# Patient Record
Sex: FEMALE | Race: WHITE | NOT HISPANIC OR LATINO | URBAN - METROPOLITAN AREA
[De-identification: names, ages, dates, MRNs, and addresses within clinical notes are randomized per-mention and may not be internally consistent; named-entity substitution may affect disease eponyms.]

---

## 2023-11-11 VITALS
RESPIRATION RATE: 17 BRPM | SYSTOLIC BLOOD PRESSURE: 141 MMHG | DIASTOLIC BLOOD PRESSURE: 88 MMHG | TEMPERATURE: 98 F | HEART RATE: 123 BPM | OXYGEN SATURATION: 96 %

## 2023-11-11 LAB
ALBUMIN SERPL ELPH-MCNC: 4.2 G/DL — SIGNIFICANT CHANGE UP (ref 3.4–5)
ALBUMIN SERPL ELPH-MCNC: 4.2 G/DL — SIGNIFICANT CHANGE UP (ref 3.4–5)
ALP SERPL-CCNC: 74 U/L — SIGNIFICANT CHANGE UP (ref 40–120)
ALP SERPL-CCNC: 74 U/L — SIGNIFICANT CHANGE UP (ref 40–120)
ALT FLD-CCNC: 34 U/L — SIGNIFICANT CHANGE UP (ref 12–42)
ALT FLD-CCNC: 34 U/L — SIGNIFICANT CHANGE UP (ref 12–42)
ANION GAP SERPL CALC-SCNC: 10 MMOL/L — SIGNIFICANT CHANGE UP (ref 9–16)
ANION GAP SERPL CALC-SCNC: 10 MMOL/L — SIGNIFICANT CHANGE UP (ref 9–16)
APTT BLD: 41.7 SEC — HIGH (ref 24.5–35.6)
APTT BLD: 41.7 SEC — HIGH (ref 24.5–35.6)
AST SERPL-CCNC: 19 U/L — SIGNIFICANT CHANGE UP (ref 15–37)
AST SERPL-CCNC: 19 U/L — SIGNIFICANT CHANGE UP (ref 15–37)
BASOPHILS # BLD AUTO: 0.04 K/UL — SIGNIFICANT CHANGE UP (ref 0–0.2)
BASOPHILS # BLD AUTO: 0.04 K/UL — SIGNIFICANT CHANGE UP (ref 0–0.2)
BASOPHILS NFR BLD AUTO: 0.5 % — SIGNIFICANT CHANGE UP (ref 0–2)
BASOPHILS NFR BLD AUTO: 0.5 % — SIGNIFICANT CHANGE UP (ref 0–2)
BILIRUB SERPL-MCNC: 0.4 MG/DL — SIGNIFICANT CHANGE UP (ref 0.2–1.2)
BILIRUB SERPL-MCNC: 0.4 MG/DL — SIGNIFICANT CHANGE UP (ref 0.2–1.2)
BUN SERPL-MCNC: 17 MG/DL — SIGNIFICANT CHANGE UP (ref 7–23)
BUN SERPL-MCNC: 17 MG/DL — SIGNIFICANT CHANGE UP (ref 7–23)
CALCIUM SERPL-MCNC: 9.3 MG/DL — SIGNIFICANT CHANGE UP (ref 8.5–10.5)
CALCIUM SERPL-MCNC: 9.3 MG/DL — SIGNIFICANT CHANGE UP (ref 8.5–10.5)
CHLORIDE SERPL-SCNC: 100 MMOL/L — SIGNIFICANT CHANGE UP (ref 96–108)
CHLORIDE SERPL-SCNC: 100 MMOL/L — SIGNIFICANT CHANGE UP (ref 96–108)
CO2 SERPL-SCNC: 26 MMOL/L — SIGNIFICANT CHANGE UP (ref 22–31)
CO2 SERPL-SCNC: 26 MMOL/L — SIGNIFICANT CHANGE UP (ref 22–31)
CREAT SERPL-MCNC: 0.89 MG/DL — SIGNIFICANT CHANGE UP (ref 0.5–1.3)
CREAT SERPL-MCNC: 0.89 MG/DL — SIGNIFICANT CHANGE UP (ref 0.5–1.3)
EGFR: 66 ML/MIN/1.73M2 — SIGNIFICANT CHANGE UP
EGFR: 66 ML/MIN/1.73M2 — SIGNIFICANT CHANGE UP
EOSINOPHIL # BLD AUTO: 0.11 K/UL — SIGNIFICANT CHANGE UP (ref 0–0.5)
EOSINOPHIL # BLD AUTO: 0.11 K/UL — SIGNIFICANT CHANGE UP (ref 0–0.5)
EOSINOPHIL NFR BLD AUTO: 1.5 % — SIGNIFICANT CHANGE UP (ref 0–6)
EOSINOPHIL NFR BLD AUTO: 1.5 % — SIGNIFICANT CHANGE UP (ref 0–6)
GLUCOSE SERPL-MCNC: 126 MG/DL — HIGH (ref 70–99)
GLUCOSE SERPL-MCNC: 126 MG/DL — HIGH (ref 70–99)
HCT VFR BLD CALC: 39.9 % — SIGNIFICANT CHANGE UP (ref 34.5–45)
HCT VFR BLD CALC: 39.9 % — SIGNIFICANT CHANGE UP (ref 34.5–45)
HGB BLD-MCNC: 13.4 G/DL — SIGNIFICANT CHANGE UP (ref 11.5–15.5)
HGB BLD-MCNC: 13.4 G/DL — SIGNIFICANT CHANGE UP (ref 11.5–15.5)
IMM GRANULOCYTES NFR BLD AUTO: 0.4 % — SIGNIFICANT CHANGE UP (ref 0–0.9)
IMM GRANULOCYTES NFR BLD AUTO: 0.4 % — SIGNIFICANT CHANGE UP (ref 0–0.9)
INR BLD: 1.07 — SIGNIFICANT CHANGE UP (ref 0.85–1.18)
INR BLD: 1.07 — SIGNIFICANT CHANGE UP (ref 0.85–1.18)
LYMPHOCYTES # BLD AUTO: 2.82 K/UL — SIGNIFICANT CHANGE UP (ref 1–3.3)
LYMPHOCYTES # BLD AUTO: 2.82 K/UL — SIGNIFICANT CHANGE UP (ref 1–3.3)
LYMPHOCYTES # BLD AUTO: 37.7 % — SIGNIFICANT CHANGE UP (ref 13–44)
LYMPHOCYTES # BLD AUTO: 37.7 % — SIGNIFICANT CHANGE UP (ref 13–44)
MCHC RBC-ENTMCNC: 33.6 GM/DL — SIGNIFICANT CHANGE UP (ref 32–36)
MCHC RBC-ENTMCNC: 33.6 GM/DL — SIGNIFICANT CHANGE UP (ref 32–36)
MCHC RBC-ENTMCNC: 34.6 PG — HIGH (ref 27–34)
MCHC RBC-ENTMCNC: 34.6 PG — HIGH (ref 27–34)
MCV RBC AUTO: 103.1 FL — HIGH (ref 80–100)
MCV RBC AUTO: 103.1 FL — HIGH (ref 80–100)
MONOCYTES # BLD AUTO: 0.75 K/UL — SIGNIFICANT CHANGE UP (ref 0–0.9)
MONOCYTES # BLD AUTO: 0.75 K/UL — SIGNIFICANT CHANGE UP (ref 0–0.9)
MONOCYTES NFR BLD AUTO: 10 % — SIGNIFICANT CHANGE UP (ref 2–14)
MONOCYTES NFR BLD AUTO: 10 % — SIGNIFICANT CHANGE UP (ref 2–14)
NEUTROPHILS # BLD AUTO: 3.74 K/UL — SIGNIFICANT CHANGE UP (ref 1.8–7.4)
NEUTROPHILS # BLD AUTO: 3.74 K/UL — SIGNIFICANT CHANGE UP (ref 1.8–7.4)
NEUTROPHILS NFR BLD AUTO: 49.9 % — SIGNIFICANT CHANGE UP (ref 43–77)
NEUTROPHILS NFR BLD AUTO: 49.9 % — SIGNIFICANT CHANGE UP (ref 43–77)
NRBC # BLD: 0 /100 WBCS — SIGNIFICANT CHANGE UP (ref 0–0)
NRBC # BLD: 0 /100 WBCS — SIGNIFICANT CHANGE UP (ref 0–0)
PLATELET # BLD AUTO: 195 K/UL — SIGNIFICANT CHANGE UP (ref 150–400)
PLATELET # BLD AUTO: 195 K/UL — SIGNIFICANT CHANGE UP (ref 150–400)
POTASSIUM SERPL-MCNC: 3.8 MMOL/L — SIGNIFICANT CHANGE UP (ref 3.5–5.3)
POTASSIUM SERPL-MCNC: 3.8 MMOL/L — SIGNIFICANT CHANGE UP (ref 3.5–5.3)
POTASSIUM SERPL-SCNC: 3.8 MMOL/L — SIGNIFICANT CHANGE UP (ref 3.5–5.3)
POTASSIUM SERPL-SCNC: 3.8 MMOL/L — SIGNIFICANT CHANGE UP (ref 3.5–5.3)
PROT SERPL-MCNC: 7.4 G/DL — SIGNIFICANT CHANGE UP (ref 6.4–8.2)
PROT SERPL-MCNC: 7.4 G/DL — SIGNIFICANT CHANGE UP (ref 6.4–8.2)
PROTHROM AB SERPL-ACNC: 12.1 SEC — SIGNIFICANT CHANGE UP (ref 9.5–13)
PROTHROM AB SERPL-ACNC: 12.1 SEC — SIGNIFICANT CHANGE UP (ref 9.5–13)
RBC # BLD: 3.87 M/UL — SIGNIFICANT CHANGE UP (ref 3.8–5.2)
RBC # BLD: 3.87 M/UL — SIGNIFICANT CHANGE UP (ref 3.8–5.2)
RBC # FLD: 12.7 % — SIGNIFICANT CHANGE UP (ref 10.3–14.5)
RBC # FLD: 12.7 % — SIGNIFICANT CHANGE UP (ref 10.3–14.5)
SODIUM SERPL-SCNC: 136 MMOL/L — SIGNIFICANT CHANGE UP (ref 132–145)
SODIUM SERPL-SCNC: 136 MMOL/L — SIGNIFICANT CHANGE UP (ref 132–145)
TROPONIN I, HIGH SENSITIVITY RESULT: 152.7 NG/L — HIGH
TROPONIN I, HIGH SENSITIVITY RESULT: 152.7 NG/L — HIGH
TROPONIN I, HIGH SENSITIVITY RESULT: 170.4 NG/L — HIGH
TROPONIN I, HIGH SENSITIVITY RESULT: 170.4 NG/L — HIGH
WBC # BLD: 7.49 K/UL — SIGNIFICANT CHANGE UP (ref 3.8–10.5)
WBC # BLD: 7.49 K/UL — SIGNIFICANT CHANGE UP (ref 3.8–10.5)
WBC # FLD AUTO: 7.49 K/UL — SIGNIFICANT CHANGE UP (ref 3.8–10.5)
WBC # FLD AUTO: 7.49 K/UL — SIGNIFICANT CHANGE UP (ref 3.8–10.5)

## 2023-11-11 PROCEDURE — 99291 CRITICAL CARE FIRST HOUR: CPT

## 2023-11-11 PROCEDURE — 70496 CT ANGIOGRAPHY HEAD: CPT | Mod: 26

## 2023-11-11 PROCEDURE — 71045 X-RAY EXAM CHEST 1 VIEW: CPT | Mod: 26

## 2023-11-11 PROCEDURE — 0042T: CPT

## 2023-11-11 PROCEDURE — 70498 CT ANGIOGRAPHY NECK: CPT | Mod: 26

## 2023-11-11 RX ORDER — APIXABAN 2.5 MG/1
2.5 TABLET, FILM COATED ORAL ONCE
Refills: 0 | Status: COMPLETED | OUTPATIENT
Start: 2023-11-11 | End: 2023-11-11

## 2023-11-11 RX ORDER — CLOPIDOGREL BISULFATE 75 MG/1
300 TABLET, FILM COATED ORAL ONCE
Refills: 0 | Status: DISCONTINUED | OUTPATIENT
Start: 2023-11-11 | End: 2023-11-11

## 2023-11-11 RX ORDER — ASPIRIN/CALCIUM CARB/MAGNESIUM 324 MG
325 TABLET ORAL ONCE
Refills: 0 | Status: DISCONTINUED | OUTPATIENT
Start: 2023-11-11 | End: 2023-11-11

## 2023-11-11 RX ADMIN — APIXABAN 2.5 MILLIGRAM(S): 2.5 TABLET, FILM COATED ORAL at 23:51

## 2023-11-11 NOTE — CONSULT NOTE ADULT - ASSESSMENT
77f hx R MCA aneurysem s/p coiling ?2022, afib on eliquis taken today, pt eating w/ friend when she became light headed and passed out when coming to conciousness patient w/ intelligible speech LKN 7pm, however symptoms resolved within 15mins. CTA w/ moderate stenosis of L PCA p2 segment.     dx TIA vs syncopal episode 2/2 afib   administer asa 325mg x1, plavix 300mg x1   txfer to stroke unit [facility] under Dr. Villarreal for further workup/ management

## 2023-11-11 NOTE — ED ADULT NURSE NOTE - NSFALLUNIVINTERV_ED_ALL_ED
Bed/Stretcher in lowest position, wheels locked, appropriate side rails in place/Call bell, personal items and telephone in reach/Instruct patient to call for assistance before getting out of bed/chair/stretcher/Non-slip footwear applied when patient is off stretcher/[place] to call system/Physically safe environment - no spills, clutter or unnecessary equipment/Purposeful proactive rounding/Room/bathroom lighting operational, light cord in reach

## 2023-11-11 NOTE — ED PROVIDER NOTE - OBJECTIVE STATEMENT
77-year-old female history of A-fib on Eliquis, cerebral aneurysm s/p stent 2021 now here with episode of word salad followed by brief syncopal episode while at dinner with a quick return to baseline.  Symptoms started at 7 PM.  Patient woke up back to baseline.  NIH 0 on arrival.  No neurodeficits. stroke code called on arrival.  Denies nausea, vomiting, fevers, chills, shortness of breath, chest pain.

## 2023-11-11 NOTE — ED PROVIDER NOTE - CRITICAL CARE ATTENDING CONTRIBUTION TO CARE
The patient was seen immediately upon arrival due to a high probability of imminent or life-threatening deterioration secondary to stroke/tia, which required my direct attention, intervention, and personal management at the bedside. I have personally provided critical care time exclusive of time spent on separately billable procedures. Time includes review of laboratory data, radiology results, discussion with consultants, discussion with the patient's family, and monitoring for potential decompensation.    pt with history of A-fib on Eliquis, cerebral artery aneurysm s/p stent presents with witnessed  brief syncope and slurring of words PTA. return to baseline within minutes. NIHSS 0 on arrival.    SUKUMAR Santiago initiated Telestroke consult  admit to dr kunz neurology  for r/o tia

## 2023-11-11 NOTE — ED PROVIDER NOTE - PROGRESS NOTE DETAILS
Patient endorsed to neurology telestroke initially advised admission for TIA work-up.  Endorsed to covering stroke attending.  Agreed pending work-up.  Patient troponin returning mildly elevated with no ischemic EKG changes.  Attempted to contact cardiology.  Advised patient with TIA can make troponins and advised neurology admission.  Through shared decision making agreed to trend troponin and if stable admit to neurology telemetry stroke service.

## 2023-11-11 NOTE — ED ADULT TRIAGE NOTE - CHIEF COMPLAINT QUOTE
patient BIBA from restaurant with friend for syncope, lightheadedness, episode of unintelligible speech around 7pm; stroke code called in front triage; hx a-fib on eliquis and baby asa 81mg

## 2023-11-11 NOTE — ED PROVIDER NOTE - CLINICAL SUMMARY MEDICAL DECISION MAKING FREE TEXT BOX
Patient here history of A-fib on Eliquis, cerebral artery aneurysm s/p stent now here with episode of word salad while at dinner followed by brief syncopal episode with a quick return to baseline.  Patient arrives neurologically intact with NIH of 0.  Telestroke initiated advised by telestroke attending TIA work-up and admission.  Endorsed to stroke attending [place] agreed admission pending work-up.  Troponin mildly elevated with no EKG ischemic changes.  Advised by cardiology neurology admission with cardiology consult.  Through shared decision making agreed to trend troponin.  If troponin stable agree with neurology admission to stroke service.

## 2023-11-11 NOTE — CONSULT NOTE ADULT - SUBJECTIVE AND OBJECTIVE BOX
HISTORY OF PRESENT ILLNESS:  77f hx afib on eliquis taken today, pt eating w/ friend when she became light headed and passed out when coming to conciousness patient w/ intelligible speech LKN 7pm, however symptoms resolved within 15mins.     PAST SURGICAL HISTORY:    HOME MEDICATIONS:  Home Medications:      FAMILY HISTORY:    SOCIAL HISTORY:    ALLERGIES:  fish (Vomiting)  No Known Allergies      VITALS/DATA/ORDERS: [x] Reviewed  Vital Signs Last 24 Hrs  T(C): 36.4 (11 Nov 2023 19:46), Max: 36.4 (11 Nov 2023 19:46)  T(F): 97.5 (11 Nov 2023 19:46), Max: 97.5 (11 Nov 2023 19:46)  HR: 110 (11 Nov 2023 20:24) (110 - 123)  BP: 147/81 (11 Nov 2023 20:24) (141/88 - 147/81)  BP(mean): --  RR: 16 (11 Nov 2023 20:24) (16 - 17)  SpO2: 100% (11 Nov 2023 20:24) (96% - 100%)    Parameters below as of 11 Nov 2023 20:24  Patient On (Oxygen Delivery Method): room air                            13.4   7.49  )-----------( 195      ( 11 Nov 2023 19:51 )             39.9     11-11    136  |  100  |  17  ----------------------------<  126<H>  3.8   |  26  |  0.89    Ca    9.3      11 Nov 2023 19:51    TPro  7.4  /  Alb  4.2  /  TBili  0.4  /  DBili  x   /  AST  19  /  ALT  34  /  AlkPhos  74  11-11    PT/INR - ( 11 Nov 2023 19:51 )   PT: 12.1 sec;   INR: 1.07          PTT - ( 11 Nov 2023 19:51 )  PTT:41.7 sec  CAPILLARY BLOOD GLUCOSE      POCT Blood Glucose.: 106 mg/dL (11 Nov 2023 19:53)    CT Head:     EXAMINATION: Assisted by (OSH staff)  NIHSS: 0    1A: Level of consciousness       0= Alert; keenly responsive       +1= Arouses to minor stimulation       +2= Requires repeated stimulation to arouse       +2= Movements to pain       +3= Postures or unresponsive  1B: Ask month and age       0= Both questions right       +1= 1 question right       +2= 0 questions right       +1= Dysarthric/intubated/trauma/language barrier       +2= Aphasic  1C: "Blink eyes" and "Squeeze Hands"       0= Performs both       +1= Performs 1 task       +2= Performs 0 tasks    2: Horizontal EOMs       0= Normal       +1= Partial gaze palsy: can be overcome       +1= Partial gaze palsy: corrects w/ oculocephalic reflex        +2= Forzed gaze palsy: cannot be overcome    3: Visual fields       0= No visual loss       +1= Partial hemianopia       +2= Complete hemianopia       +3= Patient is b/l blind       +3= B/l hemianopia    4: Facial palsy (use grimace if obtunded)       0= Normal symmetry       +1= Minor paralysis (flat NLF, smile asymmetry)       +2= Partial paralysis ( lower face)       +3= Unilateral complete paralysis (upper/lower face)       +3= B/l complete paralysis (upper/lower face)    5A: Left arm motor drift (count out loud and use fingers to show count)       0= No drift x 10 seconds       +1= Drift but doesn't hit bed       +2= Drift, hits bed       +2= Some effort against gravity       +3= No effort against gravity       +4= No movement       0= Amputation/joint fusion  5B: Right arm motor drift       0= No drift x 10 seconds       +1= Drift but doesn't hit bed       +2= Drift, hits bed       +2= Some effort against gravity       +3= No effort against gravity       +4= No movement       0= Amputation/joint fusion    6A: Left leg motor drift       0= No drift x 10 seconds       +1= Drift but doesn't hit bed       +2= Drift, hits bed       +2= Some effort against gravity       +3= No effort against gravity       +4= No movement       0= Amputation/joint fusion    6B: Right leg motor drift       0= No drift x 10 seconds       +1= Drift but doesn't hit bed       +2= Drift, hits bed       +2= Some effort against gravity       +3= No effort against gravity       +4= No movement       0= Amputation/joint fusion    7: Limb ataxia (FNF/heel-shin)       0= No ataxia       +1= Ataxia in 1 limb       +2= Ataxia in 2 limbs       0= Does not understand       0= Paralyzed       0= Amputation/joint fusion    8: Sensation       0= Normal, no sensory loss       +1= mild-moderate loss: less sharp/more dull       +1= mild-moderate loss: can sense being touched       +2= Complete loss: cannot sense being touched at all       +2= No response and quadriplegic       +2= Coma/unresponsive    9: Language/aphasia- describe the scene (on holley); name the items; read the sentences (on holley)       0= Normal, no aphasia       +1= mild-moderate aphaisa: some obvious changes without significant limitation       +2= Severe aphasia: fragmentary expression, inference needed, cannot identify materials       +3= Mute/global aphasia: no usable speech/auditory comprehension       +3= coma/unresponsive    10: Dysarthria- read the words       0= Normal       +1= mild-moderate dysarthria: slurring but can be understood       +2= Severe dysarthria: unintelligible slurring or out of proportion to dysphasia       +2= Mute/anarthric        0= Intubated/unable to test    11: Extinction/inattention       0= No abnormality       +1= visual/tactile/auditory/spatial/personal inattention       +1= Extinction to b/l simultaneous stimulation       +2= Profound micah-inattention (e.g. does not recognize own hand)       +2= extinction to > 1 modality

## 2023-11-12 ENCOUNTER — INPATIENT (INPATIENT)
Facility: HOSPITAL | Age: 78
LOS: 1 days | Discharge: HOME | DRG: 310 | End: 2023-11-14
Attending: PSYCHIATRY & NEUROLOGY | Admitting: PSYCHIATRY & NEUROLOGY
Payer: MEDICARE

## 2023-11-12 DIAGNOSIS — Z98.890 OTHER SPECIFIED POSTPROCEDURAL STATES: Chronic | ICD-10-CM

## 2023-11-12 LAB
A1C WITH ESTIMATED AVERAGE GLUCOSE RESULT: 5.4 % — SIGNIFICANT CHANGE UP (ref 4–5.6)
A1C WITH ESTIMATED AVERAGE GLUCOSE RESULT: 5.4 % — SIGNIFICANT CHANGE UP (ref 4–5.6)
ANION GAP SERPL CALC-SCNC: 10 MMOL/L — SIGNIFICANT CHANGE UP (ref 5–17)
ANION GAP SERPL CALC-SCNC: 10 MMOL/L — SIGNIFICANT CHANGE UP (ref 5–17)
BUN SERPL-MCNC: 14 MG/DL — SIGNIFICANT CHANGE UP (ref 7–23)
BUN SERPL-MCNC: 14 MG/DL — SIGNIFICANT CHANGE UP (ref 7–23)
CALCIUM SERPL-MCNC: 9 MG/DL — SIGNIFICANT CHANGE UP (ref 8.4–10.5)
CALCIUM SERPL-MCNC: 9 MG/DL — SIGNIFICANT CHANGE UP (ref 8.4–10.5)
CHLORIDE SERPL-SCNC: 104 MMOL/L — SIGNIFICANT CHANGE UP (ref 96–108)
CHLORIDE SERPL-SCNC: 104 MMOL/L — SIGNIFICANT CHANGE UP (ref 96–108)
CHOLEST SERPL-MCNC: 200 MG/DL — HIGH
CHOLEST SERPL-MCNC: 200 MG/DL — HIGH
CO2 SERPL-SCNC: 24 MMOL/L — SIGNIFICANT CHANGE UP (ref 22–31)
CO2 SERPL-SCNC: 24 MMOL/L — SIGNIFICANT CHANGE UP (ref 22–31)
CREAT SERPL-MCNC: 0.64 MG/DL — SIGNIFICANT CHANGE UP (ref 0.5–1.3)
CREAT SERPL-MCNC: 0.64 MG/DL — SIGNIFICANT CHANGE UP (ref 0.5–1.3)
EGFR: 90 ML/MIN/1.73M2 — SIGNIFICANT CHANGE UP
EGFR: 90 ML/MIN/1.73M2 — SIGNIFICANT CHANGE UP
ESTIMATED AVERAGE GLUCOSE: 108 MG/DL — SIGNIFICANT CHANGE UP (ref 68–114)
ESTIMATED AVERAGE GLUCOSE: 108 MG/DL — SIGNIFICANT CHANGE UP (ref 68–114)
GLUCOSE SERPL-MCNC: 95 MG/DL — SIGNIFICANT CHANGE UP (ref 70–99)
GLUCOSE SERPL-MCNC: 95 MG/DL — SIGNIFICANT CHANGE UP (ref 70–99)
HCT VFR BLD CALC: 37.7 % — SIGNIFICANT CHANGE UP (ref 34.5–45)
HCT VFR BLD CALC: 37.7 % — SIGNIFICANT CHANGE UP (ref 34.5–45)
HDLC SERPL-MCNC: 53 MG/DL — SIGNIFICANT CHANGE UP
HDLC SERPL-MCNC: 53 MG/DL — SIGNIFICANT CHANGE UP
HGB BLD-MCNC: 12.6 G/DL — SIGNIFICANT CHANGE UP (ref 11.5–15.5)
HGB BLD-MCNC: 12.6 G/DL — SIGNIFICANT CHANGE UP (ref 11.5–15.5)
LIPID PNL WITH DIRECT LDL SERPL: 134 MG/DL — HIGH
LIPID PNL WITH DIRECT LDL SERPL: 134 MG/DL — HIGH
MAGNESIUM SERPL-MCNC: 2.2 MG/DL — SIGNIFICANT CHANGE UP (ref 1.6–2.6)
MAGNESIUM SERPL-MCNC: 2.2 MG/DL — SIGNIFICANT CHANGE UP (ref 1.6–2.6)
MCHC RBC-ENTMCNC: 33.4 GM/DL — SIGNIFICANT CHANGE UP (ref 32–36)
MCHC RBC-ENTMCNC: 33.4 GM/DL — SIGNIFICANT CHANGE UP (ref 32–36)
MCHC RBC-ENTMCNC: 34.1 PG — HIGH (ref 27–34)
MCHC RBC-ENTMCNC: 34.1 PG — HIGH (ref 27–34)
MCV RBC AUTO: 102.2 FL — HIGH (ref 80–100)
MCV RBC AUTO: 102.2 FL — HIGH (ref 80–100)
NON HDL CHOLESTEROL: 147 MG/DL — HIGH
NON HDL CHOLESTEROL: 147 MG/DL — HIGH
NRBC # BLD: 0 /100 WBCS — SIGNIFICANT CHANGE UP (ref 0–0)
NRBC # BLD: 0 /100 WBCS — SIGNIFICANT CHANGE UP (ref 0–0)
PHOSPHATE SERPL-MCNC: 3.7 MG/DL — SIGNIFICANT CHANGE UP (ref 2.5–4.5)
PHOSPHATE SERPL-MCNC: 3.7 MG/DL — SIGNIFICANT CHANGE UP (ref 2.5–4.5)
PLATELET # BLD AUTO: 154 K/UL — SIGNIFICANT CHANGE UP (ref 150–400)
PLATELET # BLD AUTO: 154 K/UL — SIGNIFICANT CHANGE UP (ref 150–400)
POTASSIUM SERPL-MCNC: 4 MMOL/L — SIGNIFICANT CHANGE UP (ref 3.5–5.3)
POTASSIUM SERPL-MCNC: 4 MMOL/L — SIGNIFICANT CHANGE UP (ref 3.5–5.3)
POTASSIUM SERPL-SCNC: 4 MMOL/L — SIGNIFICANT CHANGE UP (ref 3.5–5.3)
POTASSIUM SERPL-SCNC: 4 MMOL/L — SIGNIFICANT CHANGE UP (ref 3.5–5.3)
RBC # BLD: 3.69 M/UL — LOW (ref 3.8–5.2)
RBC # BLD: 3.69 M/UL — LOW (ref 3.8–5.2)
RBC # FLD: 12.9 % — SIGNIFICANT CHANGE UP (ref 10.3–14.5)
RBC # FLD: 12.9 % — SIGNIFICANT CHANGE UP (ref 10.3–14.5)
SODIUM SERPL-SCNC: 138 MMOL/L — SIGNIFICANT CHANGE UP (ref 135–145)
SODIUM SERPL-SCNC: 138 MMOL/L — SIGNIFICANT CHANGE UP (ref 135–145)
TRIGL SERPL-MCNC: 67 MG/DL — SIGNIFICANT CHANGE UP
TRIGL SERPL-MCNC: 67 MG/DL — SIGNIFICANT CHANGE UP
TROPONIN T, HIGH SENSITIVITY RESULT: 21 NG/L — SIGNIFICANT CHANGE UP (ref 0–51)
TROPONIN T, HIGH SENSITIVITY RESULT: 21 NG/L — SIGNIFICANT CHANGE UP (ref 0–51)
TSH SERPL-MCNC: 1.83 UIU/ML — SIGNIFICANT CHANGE UP (ref 0.27–4.2)
TSH SERPL-MCNC: 1.83 UIU/ML — SIGNIFICANT CHANGE UP (ref 0.27–4.2)
WBC # BLD: 5.77 K/UL — SIGNIFICANT CHANGE UP (ref 3.8–10.5)
WBC # BLD: 5.77 K/UL — SIGNIFICANT CHANGE UP (ref 3.8–10.5)
WBC # FLD AUTO: 5.77 K/UL — SIGNIFICANT CHANGE UP (ref 3.8–10.5)
WBC # FLD AUTO: 5.77 K/UL — SIGNIFICANT CHANGE UP (ref 3.8–10.5)

## 2023-11-12 PROCEDURE — 99255 IP/OBS CONSLTJ NEW/EST HI 80: CPT

## 2023-11-12 PROCEDURE — 99253 IP/OBS CNSLTJ NEW/EST LOW 45: CPT

## 2023-11-12 PROCEDURE — 95718 EEG PHYS/QHP 2-12 HR W/VEEG: CPT

## 2023-11-12 PROCEDURE — 99223 1ST HOSP IP/OBS HIGH 75: CPT

## 2023-11-12 PROCEDURE — 71275 CT ANGIOGRAPHY CHEST: CPT | Mod: 26

## 2023-11-12 RX ORDER — CALCIUM CARBONATE 500(1250)
1 TABLET ORAL DAILY
Refills: 0 | Status: DISCONTINUED | OUTPATIENT
Start: 2023-11-12 | End: 2023-11-14

## 2023-11-12 RX ORDER — INFLUENZA VIRUS VACCINE 15; 15; 15; 15 UG/.5ML; UG/.5ML; UG/.5ML; UG/.5ML
0.7 SUSPENSION INTRAMUSCULAR ONCE
Refills: 0 | Status: DISCONTINUED | OUTPATIENT
Start: 2023-11-12 | End: 2023-11-14

## 2023-11-12 RX ORDER — ATORVASTATIN CALCIUM 80 MG/1
80 TABLET, FILM COATED ORAL AT BEDTIME
Refills: 0 | Status: DISCONTINUED | OUTPATIENT
Start: 2023-11-12 | End: 2023-11-14

## 2023-11-12 RX ORDER — SODIUM CHLORIDE 9 MG/ML
500 INJECTION INTRAMUSCULAR; INTRAVENOUS; SUBCUTANEOUS
Refills: 0 | Status: COMPLETED | OUTPATIENT
Start: 2023-11-12 | End: 2023-11-12

## 2023-11-12 RX ORDER — LANOLIN ALCOHOL/MO/W.PET/CERES
3 CREAM (GRAM) TOPICAL AT BEDTIME
Refills: 0 | Status: DISCONTINUED | OUTPATIENT
Start: 2023-11-12 | End: 2023-11-14

## 2023-11-12 RX ORDER — METOPROLOL TARTRATE 50 MG
12.5 TABLET ORAL ONCE
Refills: 0 | Status: COMPLETED | OUTPATIENT
Start: 2023-11-12 | End: 2023-11-12

## 2023-11-12 RX ORDER — CHOLECALCIFEROL (VITAMIN D3) 125 MCG
1000 CAPSULE ORAL DAILY
Refills: 0 | Status: DISCONTINUED | OUTPATIENT
Start: 2023-11-12 | End: 2023-11-14

## 2023-11-12 RX ORDER — CALCIUM CARBONATE 500(1250)
1 TABLET ORAL
Refills: 0 | DISCHARGE

## 2023-11-12 RX ORDER — CHOLECALCIFEROL (VITAMIN D3) 125 MCG
1 CAPSULE ORAL
Refills: 0 | DISCHARGE

## 2023-11-12 RX ORDER — SODIUM CHLORIDE 9 MG/ML
500 INJECTION INTRAMUSCULAR; INTRAVENOUS; SUBCUTANEOUS ONCE
Refills: 0 | Status: COMPLETED | OUTPATIENT
Start: 2023-11-12 | End: 2023-11-12

## 2023-11-12 RX ORDER — METOPROLOL TARTRATE 50 MG
12.5 TABLET ORAL
Refills: 0 | Status: DISCONTINUED | OUTPATIENT
Start: 2023-11-12 | End: 2023-11-12

## 2023-11-12 RX ORDER — APIXABAN 2.5 MG/1
2.5 TABLET, FILM COATED ORAL EVERY 12 HOURS
Refills: 0 | Status: DISCONTINUED | OUTPATIENT
Start: 2023-11-12 | End: 2023-11-14

## 2023-11-12 RX ORDER — METOPROLOL TARTRATE 50 MG
12.5 TABLET ORAL
Refills: 0 | Status: DISCONTINUED | OUTPATIENT
Start: 2023-11-13 | End: 2023-11-13

## 2023-11-12 RX ORDER — ASPIRIN/CALCIUM CARB/MAGNESIUM 324 MG
81 TABLET ORAL DAILY
Refills: 0 | Status: DISCONTINUED | OUTPATIENT
Start: 2023-11-12 | End: 2023-11-14

## 2023-11-12 RX ADMIN — Medication 1 TABLET(S): at 11:32

## 2023-11-12 RX ADMIN — APIXABAN 2.5 MILLIGRAM(S): 2.5 TABLET, FILM COATED ORAL at 05:32

## 2023-11-12 RX ADMIN — Medication 12.5 MILLIGRAM(S): at 17:33

## 2023-11-12 RX ADMIN — Medication 1000 UNIT(S): at 11:42

## 2023-11-12 RX ADMIN — APIXABAN 2.5 MILLIGRAM(S): 2.5 TABLET, FILM COATED ORAL at 17:35

## 2023-11-12 RX ADMIN — SODIUM CHLORIDE 500 MILLILITER(S): 9 INJECTION INTRAMUSCULAR; INTRAVENOUS; SUBCUTANEOUS at 08:21

## 2023-11-12 RX ADMIN — SODIUM CHLORIDE 70 MILLILITER(S): 9 INJECTION INTRAMUSCULAR; INTRAVENOUS; SUBCUTANEOUS at 22:40

## 2023-11-12 RX ADMIN — Medication 81 MILLIGRAM(S): at 11:32

## 2023-11-12 RX ADMIN — Medication 3 MILLIGRAM(S): at 22:27

## 2023-11-12 NOTE — CONSULT NOTE ADULT - SUBJECTIVE AND OBJECTIVE BOX
Patient is a 77y old  Female who presents with a chief complaint of TIA (12 Nov 2023 02:04)      HPI:   **STROKE HPI***    HPI: 77y Female with PMHx of afib on Eliquis, hx of R MCA aneursym s/p coil embolization on ASA 81, breast CA in remission, presents to [facility] for TIA vs stroke evaluation. Pt presented to [facility] after having episode of gibberish speech followed by brief syncopal episode with quick resolution of symptoms. Pt denies headache, visual disturbances, weakness/numbness in extremities, unsteady gait during this event. Pt states she felt hot, subsequently felt lightheaded like she was going to faint. Pt went to sit on a stool and then she cannot recall the event. Per her friend while she was sitting on the stool she had 2 episodes of speaking gibberish within 2 minutes. Pt then had a syncopal episode, bystanders laid her on the floor and patient then remembers waking up on the floor. Pt states as soon as she woke up she felt 90% back to normal. Once she arrived at [facility], patient states she felt completely back to herself. Stroke code called at [facility], NIHSS 0, CT imaging significant for focal moderate stenosis of left PCA P2 segment, s/p R MCA aneurysm coil embolization. Pt found to have elevated troponin in ED, EKG with sinus tachycardia, repeat troponin downtrended.   (12 Nov 2023 02:04)    Patient seen and examined at bedside. Neurological deficits have resolved. Noted dark puncture wound (appears to be insect bite) with surrounding skin discoloration, at the ankle.       REVIEW OF SYSTEMS:   12 pt ROS otherwise negative      PAST MEDICAL & SURGICAL HISTORY:  Atrial fibrillation  History of intracranial aneurysm  Osteoporosis  History of breast cancer  S/P cerebral aneurysm repair    SOCIAL HISTORY:  Allergies  No Known Allergies  Intolerances  fish (Vomiting)    HOME MEDICATIONS:  aspirin 81 mg oral tablet: 1 tab(s) orally once a day (12 Nov 2023 01:36)  calcium (as carbonate) 600 mg oral tablet: 1 tab(s) orally once a day (12 Nov 2023 01:45)  cholecalciferol 25 mcg (1000 intl units) oral capsule: 1 cap(s) orally once a day (12 Nov 2023 01:36)  Eliquis 2.5 mg oral tablet: 1 tab(s) orally 2 times a day (12 Nov 2023 01:36)  Multiple Vitamins oral capsule: 1 cap(s) orally once a day (12 Nov 2023 01:36)    Vital Signs Last 24 Hrs  T(C): 37.2 (12 Nov 2023 13:51), Max: 37.2 (12 Nov 2023 13:51)  T(F): 98.9 (12 Nov 2023 13:51), Max: 98.9 (12 Nov 2023 13:51)  HR: 120 (12 Nov 2023 11:36) (110 - 125)  BP: 122/76 (12 Nov 2023 11:36) (113/72 - 147/81)  BP(mean): 94 (12 Nov 2023 11:36) (88 - 102)  RR: 18 (12 Nov 2023 11:36) (16 - 18)  SpO2: 98% (12 Nov 2023 11:36) (94% - 100%)    Parameters below as of 12 Nov 2023 11:36  Patient On (Oxygen Delivery Method): room air      I&O's Summary    12 Nov 2023 07:01  -  12 Nov 2023 14:36  --------------------------------------------------------  IN: 600 mL / OUT: 0 mL / NET: 600 mL    PHYSICAL EXAM:  GENERAL: NAD, mild respiratory distress   HEAD:  Atraumatic, Normocephalic  EYES: EOMI, conjunctiva and sclera clear  NECK: Supple, No JVD  CHEST/LUNG: Clear to auscultation bilaterally; No wheeze  HEART: Regular rate and rhythm; No murmurs  ABDOMEN: Soft, Nontender, Nondistended; Bowel sounds present  EXTREMITIES:  2+ Peripheral Pulses, No LE edema  PSYCH: AAOx3  NEUROLOGY: non-focal  SKIN: Ankle small dark puncture wound, with surround reactive skin changes   LABS                        12.6   5.77  )-----------( 154      ( 12 Nov 2023 06:13 )             37.7                         13.4   7.49  )-----------( 195      ( 11 Nov 2023 19:51 )             39.9     11-12    138  |  104  |  14  ----------------------------<  95  4.0   |  24  |  0.64  11-11    136  |  100  |  17  ----------------------------<  126<H>  3.8   |  26  |  0.89    Ca    9.0      12 Nov 2023 06:13  Ca    9.3      11 Nov 2023 19:51  Phos  3.7     11-12  Mg     2.2     11-12    TPro  7.4  /  Alb  4.2  /  TBili  0.4  /  DBili  x   /  AST  19  /  ALT  34  /  AlkPhos  74  11-11    CAPILLARY BLOOD GLUCOSE      POCT Blood Glucose.: 106 mg/dL (11 Nov 2023 19:53)    PT/INR - ( 11 Nov 2023 19:51 )   PT: 12.1 sec;   INR: 1.07     PTT - ( 11 Nov 2023 19:51 )  PTT:41.7 sec    MEDICATIONS  (STANDING):  apixaban 2.5 milliGRAM(s) Oral every 12 hours  aspirin  chewable 81 milliGRAM(s) Oral daily  atorvastatin 80 milliGRAM(s) Oral at bedtime  calcium carbonate   1250 mG (OsCal) 1 Tablet(s) Oral daily  cholecalciferol 1000 Unit(s) Oral daily  influenza  Vaccine (HIGH DOSE) 0.7 milliLiter(s) IntraMuscular once  multivitamin 1 Tablet(s) Oral daily    ECG:  Sinus Tachycardia vs Atrial tachycardia     RADIOLOGY & ADDITIONAL STUDIES:  < from: CT Angio Neck Stroke Protocol w/ IV Cont (11.11.23 @ 20:17) >  IMPRESSION:    CT PERFUSION: No territorial perfusion deficit.    CTA INTRACRANIAL:  1. No arterial occlusion or high grade stenosis.  2. Focal moderate stenosis of left PCA P2 segment.  3. Status post stent-assisted coil embolization of right MCA bifurcation   aneurysm; no gross recurrence given degree of streak artifact.    CTA EXTRACRANIAL:  No carotid or vertebral artery steno-occlusive disease, despite   atherosclerotic plaque, nor evidence of dissection.    < end of copied text >

## 2023-11-12 NOTE — H&P ADULT - NSHPLABSRESULTS_GEN_ALL_CORE
Vital Signs Last 24 Hrs  T(C): 36.2 (12 Nov 2023 01:00), Max: 36.7 (11 Nov 2023 23:54)  T(F): 97.2 (12 Nov 2023 01:00), Max: 98 (11 Nov 2023 23:54)  HR: 124 (12 Nov 2023 00:46) (110 - 124)  BP: 128/86 (12 Nov 2023 00:46) (119/81 - 147/81)  BP(mean): 102 (12 Nov 2023 00:46) (102 - 102)  RR: 18 (12 Nov 2023 00:46) (16 - 18)  SpO2: 99% (12 Nov 2023 00:46) (94% - 100%)    POCT Blood Glucose.: 106 mg/dL (11 Nov 2023 19:53)    LABS:                        13.4   7.49  )-----------( 195      ( 11 Nov 2023 19:51 )             39.9     11-11    136  |  100  |  17  ----------------------------<  126<H>  3.8   |  26  |  0.89    Ca    9.3      11 Nov 2023 19:51    TPro  7.4  /  Alb  4.2  /  TBili  0.4  /  DBili  x   /  AST  19  /  ALT  34  /  AlkPhos  74  11-11    PT/INR - ( 11 Nov 2023 19:51 )   PT: 12.1 sec;   INR: 1.07          PTT - ( 11 Nov 2023 19:51 )  PTT:41.7 sec      Urinalysis Basic - ( 11 Nov 2023 19:51 )    Color: x / Appearance: x / SG: x / pH: x  Gluc: 126 mg/dL / Ketone: x  / Bili: x / Urobili: x   Blood: x / Protein: x / Nitrite: x   Leuk Esterase: x / RBC: x / WBC x   Sq Epi: x / Non Sq Epi: x / Bacteria: x    RADIOLOGY & ADDITIONAL STUDIES:    < from: CT Brain Stroke Protocol (11.11.23 @ 20:03) >    IMPRESSION:    No acute intracranial hemorrhage or demarcated transcortical infarction.    White matter and age-indeterminate basal ganglia sites of   microangiopathic disease.    < from: CT Brain Perfusion Maps Stroke (11.11.23 @ 20:16) >  IMPRESSION:    CT PERFUSION: No territorial perfusion deficit.    CTA INTRACRANIAL:  1. No arterial occlusion or high grade stenosis.  2. Focal moderate stenosis of left PCA P2 segment.  3. Status post stent-assisted coil embolization of right MCA bifurcation   aneurysm; no gross recurrence given degree of streak artifact.    CTA EXTRACRANIAL:  No carotid or vertebral artery steno-occlusive disease, despite   atherosclerotic plaque, nor evidence of dissection.

## 2023-11-12 NOTE — H&P ADULT - NSHPPHYSICALEXAM_GEN_ALL_CORE
Physical exam:  General: No acute distress, awake and alert  Cardiovascular: Regular rate and rhythm.  Pulmonary: No use of accessory muscles  GI: Abdomen soft, non-tender, non-distended    Neurologic:  -Mental status: Awake, alert, oriented to person, place, and time. Speech is fluent with intact naming, repetition, and comprehension, no dysarthria. Recent and remote memory intact. Follows commands. Attention/concentration intact. Fund of knowledge appropriate.  -Cranial nerves:   II: Visual fields are full to confrontation.  III, IV, VI: Extraocular movements are intact without nystagmus. Pupils equally round and reactive to light  V:  Facial sensation V1-V3 equal and intact   VII: Face is symmetric with normal eye closure and smile  VIII: Hearing is bilaterally intact to finger rub  XII: Tongue protrudes midline  Motor: Normal bulk and tone. No pronator drift. Strength bilateral upper extremity 5/5, bilateral lower extremities 5/5.  Rapid alternating movements intact and symmetric  Sensation: Intact to light touch bilaterally. No neglect or extinction on double simultaneous testing.  Coordination: No dysmetria on finger-to-nose and heel-to-shin bilaterally  Reflexes: Downgoing toes bilaterally   Gait: Narrow gait and steady    NIHSS Score: 0

## 2023-11-12 NOTE — H&P ADULT - ASSESSMENT
77y Female with PMHx of afib on Eliquis, hx of R MCA aneursym s/p coil embolization on ASA 81, breast CA in remission, presents to [facility] for TIA vs stroke evaluation. Pt presented to [facility] after having episode of gibberish speech followed by brief syncopal episode with quick resolution of symptoms. Stroke code called at [facility], NIHSS 0, CT imaging significant for focal moderate stenosis of left PCA P2 segment, s/p R MCA aneurysm coil embolization. Pt found to have elevated troponin in ED, EKG with sinus tachycardia, repeat troponin downtrended. Cardiology consulted in ED, no further cardiac workup indicated. Pt admitted to stroke tele for further management.     Neuro  #TIA vs CVA workup  - continue Eliquis 2.5mg BID   - start atorvastatin 40mg daily  - q4hr stroke neuro checks and vitals  - obtain MRI Brain without contrast  - Stroke Code HCT Results: neg   - Stroke Code CTA Results: focal moderate stenosis of left PCA P2 segment, s/p R MCA aneurysm coil embolization  - Stroke education    Cards  #HTN  - permissive hypertension, Goal -180  - obtain TTE   - Stroke Code EKG Results: sinus tachycardia     #HLD  - high dose statin as above in CVA  - LDL results: pending     Pulm  - call provider if SPO2 < 94%    GI  #Nutrition/Fluids/Electrolytes   - replete K<4 and Mg <2  - Diet: DASH/TLC     Renal  - monitor and trend Cr    Infectious Disease  - afebrile on admission, no leukocytosis    Endocrine  #DM  - A1C results: pending     - TSH results: pending     DVT Prophylaxis  - Eliquis 2.5mg BID  - SCDs for DVT prophylaxis       IDR Goals: Goals reviewed at interdisciplinary rounds with case management, social work, physical therapy, occupational therapy, and speech language pathology.   Please see specific therapy  notes for in depth goals.  Dispo: pending PT/OT      Discussed daily hospital plans and goals with patient    Discussed with Neurology Attending Dr. Villarreal  77y Female with PMHx of afib on Eliquis, hx of R MCA aneursym s/p coil embolization on ASA 81, breast CA in remission, presents to [facility] for TIA vs stroke evaluation. Pt presented to [facility] after having episode of gibberish speech followed by brief syncopal episode with quick resolution of symptoms. Stroke code called at [facility], NIHSS 0, CT imaging significant for focal moderate stenosis of left PCA P2 segment, s/p R MCA aneurysm coil embolization. Pt found to have elevated troponin in ED, EKG with sinus tachycardia, repeat troponin downtrended. Cardiology consulted in ED, no further cardiac workup indicated. Pt admitted to stroke tele for further management.     Neuro  #TIA vs CVA workup  - continue Eliquis 2.5mg BID   - start atorvastatin 40mg daily  - q4hr stroke neuro checks and vitals  - obtain MRI Brain without contrast  - obtain vEEG  - Stroke Code HCT Results: neg   - Stroke Code CTA Results: focal moderate stenosis of left PCA P2 segment, s/p R MCA aneurysm coil embolization  - Stroke education    Cards  #HTN  - permissive hypertension, Goal -180  - obtain TTE   - Stroke Code EKG Results: sinus tachycardia     #HLD  - high dose statin as above in CVA  - LDL results: pending     #elevated troponin - pt is asymptomatic, EKG with sinus tachycardia   - downtrending troponin 170.4 --> 152.7    Pulm  - call provider if SPO2 < 94%    GI  #Nutrition/Fluids/Electrolytes   - replete K<4 and Mg <2  - Diet: DASH/TLC     Renal  - monitor and trend Cr    Infectious Disease  - afebrile on admission, no leukocytosis    Endocrine  #DM  - A1C results: pending     - TSH results: pending     DVT Prophylaxis  - Eliquis 2.5mg BID  - SCDs for DVT prophylaxis       IDR Goals: Goals reviewed at interdisciplinary rounds with case management, social work, physical therapy, occupational therapy, and speech language pathology.   Please see specific therapy  notes for in depth goals.  Dispo: pending PT/OT      Discussed daily hospital plans and goals with patient    Discussed with Neurology Attending Dr. Villarreal  77y Female with PMHx of afib on Eliquis, hx of R MCA aneursym s/p coil embolization on ASA 81, breast CA in remission, presents to [facility] for TIA vs stroke evaluation. Pt presented to [facility] after having episode of gibberish speech followed by brief syncopal episode with quick resolution of symptoms. Stroke code called at [facility], NIHSS 0, CT imaging significant for focal moderate stenosis of left PCA P2 segment, s/p R MCA aneurysm coil embolization. Pt found to have elevated troponin in ED, EKG with sinus tachycardia, repeat troponin downtrended. Cardiology consulted in ED, no further cardiac workup indicated. Pt admitted to stroke tele for further management.     Neuro  #TIA vs CVA workup  - continue Eliquis 2.5mg BID; patient qualifies for 5mg of Eliquis BID, however, she reports that she asked her cardiologist and PCP to keep her on 2.5 given her age is close to 78 and she is <60kg  - continue atorvastatin 40mg daily  - q4hr stroke neuro checks and vitals  - obtain MRI Brain without contrast  - obtain vEEG; placed at 2AM on 11/12  - Stroke Code HCT Results: neg   - Stroke Code CTA Results: focal moderate stenosis of left PCA P2 segment, s/p R MCA aneurysm coil embolization  - Stroke education    Cards  #HTN  - permissive hypertension, Goal -180  - obtain TTE   - Stroke Code EKG Results: sinus tachycardia     #HLD  - high dose statin as above in CVA; patient reports intolerance, will monitor for adverse effects  - LDL results: 134     #elevated troponin - pt is asymptomatic, EKG with sinus tachycardia   - downtrending troponin 170.4 --> 152.7  - ordered repeat troponin for 11/12 next collection    #sinus tachycardia; patient reports baseline HR between 40-60's  - ordered routine EKG  - f/u trop for next collection  - ordered 500cc bolus over 1 hour   - cardiology paged, awaiting call back    Pulm  - call provider if SPO2 < 94%    GI  #Nutrition/Fluids/Electrolytes   - replete K<4 and Mg <2  - Diet: DASH/TLC     Renal  - monitor and trend Cr    Infectious Disease  - afebrile on admission, no leukocytosis    Endocrine  #DM  - A1C results: pending     - TSH results: 1.830    DVT Prophylaxis  - Eliquis 2.5mg BID  - SCDs for DVT prophylaxis       IDR Goals: Goals reviewed at interdisciplinary rounds with case management, social work, physical therapy, occupational therapy, and speech language pathology.   Please see specific therapy  notes for in depth goals.  Dispo: pending PT/OT      Discussed daily hospital plans and goals with patient    Discussed with Neurology Attending Dr. Villarreal  77y Female with PMHx of afib on Eliquis, hx of R MCA aneursym s/p coil embolization on ASA 81, breast CA in remission, presents to [facility] for TIA vs stroke evaluation. Pt presented to [facility] after having episode of gibberish speech followed by brief syncopal episode with quick resolution of symptoms. Stroke code called at [facility], NIHSS 0, CT imaging significant for focal moderate stenosis of left PCA P2 segment, s/p R MCA aneurysm coil embolization. Pt found to have elevated troponin in ED, EKG with sinus tachycardia, repeat troponin downtrended. Cardiology consulted in ED, no further cardiac workup indicated. Pt admitted to stroke tele for further management.     Neuro  #TIA vs CVA workup  - continue Eliquis 2.5mg BID; patient qualifies for 5mg of Eliquis BID, however, she reports that she asked her cardiologist and PCP to keep her on 2.5 given her age is close to 78 and she is <60kg  - continue atorvastatin 40mg daily  - q4hr stroke neuro checks and vitals  - obtain MRI Brain without contrast  - obtain vEEG; placed at 2AM on 11/12 - shows left temporal slowing, will remain in place overnight  - Stroke Code HCT Results: neg   - Stroke Code CTA Results: focal moderate stenosis of left PCA P2 segment, s/p R MCA aneurysm coil embolization  - Stroke education    Cards  #HTN  - permissive hypertension, Goal -180  - obtain TTE   - Stroke Code EKG Results: sinus tachycardia     #HLD  - high dose statin as above in CVA; patient reports intolerance, will monitor for adverse effects  - LDL results: 134     #elevated troponin - pt is asymptomatic, EKG with sinus tachycardia   - downtrending troponin 170.4 --> 152.7  - ordered repeat troponin for 11/12 next collection    #sinus tachycardia; patient reports baseline HR between 40-60's  - ordered routine EKG  - f/u trop for next collection  - ordered 500cc bolus over 1 hour   - cardiology paged, awaiting call back    Pulm  - call provider if SPO2 < 94%    #syncope  - f/u CT PE protocol    GI  #Nutrition/Fluids/Electrolytes   - replete K<4 and Mg <2  - Diet: DASH/TLC     Renal  - monitor and trend Cr    Infectious Disease  - afebrile on admission, no leukocytosis    Endocrine  #DM  - A1C results: 5.4    - TSH results: 1.830    DVT Prophylaxis  - Eliquis 2.5mg BID  - SCDs for DVT prophylaxis       IDR Goals: Goals reviewed at interdisciplinary rounds with case management, social work, physical therapy, occupational therapy, and speech language pathology.   Please see specific therapy  notes for in depth goals.  Dispo: pending PT/OT      Discussed daily hospital plans and goals with patient    Discussed with Neurology Attending Dr. Villarreal

## 2023-11-12 NOTE — CONSULT NOTE ADULT - ATTENDING COMMENTS
Patient is a 78 yo F with PMHX of Afib s/p Previous Ablation in 2021, compliant with Eliquis, hx of R MCA aneurysm s/p coil embolization in 2021, who originally presented to [facility] after a short episode of expressive aphasia with subsequent persistent narrow complex tachycardia, concerning for Atach vs Flutter for which cardiology was consulted    -Review of Studies:  - Echo 11/13/2023: . Normal left ventricular size and systolic function. Moderate symmetric left ventricular hypertrophy. Grade II left ventricular diastolic dysfunction. Normal right ventricular size and systolic function. Moderately dilated left atrium. Mild mitral regurgitation. Mild tricuspid regurgitation. Pulmonary artery systolic pressure is 35 mmHg. No pericardial effusion. Mildly dilated aortic root.  - CT 11/11/23: No arterial occlusion or high grade stenosis. Focal moderate stenosis of L PCA P2 segment. s/p stent-assisted coil embolization of R MCA bifurcation aneurysm; no gross recurrence given degree of streak artifact.    #Narrow Complex Tachycardia (Atach vs Flutter)  - Patient with Hx of Afib, s/p Ablation in 2021, reports previously being on metoprolol and Flecainide. She is followed closely in [place], where she resides by Dr Deepak Walsh (Cardiologist) and Dr Pola Torres (EP) .  - Clinically she has remained warm and well compensated  - Echo reviewed showing Normal Biventricular function with LVH, GIIDD and a large LA. No significant Valvular heart disease noted  - At this time recommend increasing Lopressor to 25 mg PO BID. Can continue to uptitrate as tolerated with plan to DC on Toprol  - Agree with EP consult. Should patient's MRI be negative for Embolic event, she would benefit from Cardioversion  - Patient 'sCHADSVASC is at least of 5(Age, Sex, TIA), placing her at higher thromboembolic risk. Please continue Eliquis at 2.5 mg PO BID ( Age 78 in a week, weight 45 Kg).  - Cardiology will continue to follow, please call with any questions

## 2023-11-12 NOTE — SPEECH LANGUAGE PATHOLOGY EVALUATION - SLP PERTINENT HISTORY OF CURRENT PROBLEM
77y Female with PMHx of afib on Eliquis, hx of R MCA aneursym s/p coil embolization on ASA 81, breast CA in remission, presents to [facility] for TIA vs stroke evaluation. Pt presented to [facility] after having episode of gibberish speech followed by brief syncopal episode with quick resolution of symptoms. Stroke code called at [facility], NIHSS 0, CT imaging significant for focal moderate stenosis of left PCA P2 segment, s/p R MCA aneurysm coil embolization.

## 2023-11-12 NOTE — PATIENT PROFILE ADULT - FALL HARM RISK - HARM RISK INTERVENTIONS
Assistance with ambulation/Assistance OOB with selected safe patient handling equipment/Communicate Risk of Fall with Harm to all staff/Monitor for mental status changes/Monitor gait and stability/Move patient closer to nurses' station/Reinforce activity limits and safety measures with patient and family/Sit up slowly, dangle for a short time, stand at bedside before walking/Tailored Fall Risk Interventions/Toileting schedule using arm’s reach rule for commode and bathroom/Use of alarms - bed, chair and/or voice tab/Visual Cue: Yellow wristband and red socks/Bed in lowest position, wheels locked, appropriate side rails in place/Call bell, personal items and telephone in reach/Instruct patient to call for assistance before getting out of bed or chair/Non-slip footwear when patient is out of bed/Belk to call system/Physically safe environment - no spills, clutter or unnecessary equipment/Purposeful Proactive Rounding/Room/bathroom lighting operational, light cord in reach

## 2023-11-12 NOTE — EEG REPORT - NS EEG TEXT BOX
[facility] Department of Neurology  Inpatient Continuous video-Electroencephalogram    Patient Name:	SUZANNE APODACA    :	1945  MRN:	[id number]    Study Start Date/Time:  2023, 01:47:06   Study End Date/Time:    Referred by: Dr Villarreal    Brief clinical history:  77 y old woman with  A fib, remote R MCA aneurysm (s/p coil embolization), breast cancer (in remission).  Now with episode of altered speech followed by syncope-like event.  Head CT revealed focal moderate stenosis of left PCA P2 segment.  Prolonged video EEG obtained to rule out subclinical seizures.      Diagnosis code:   R46.89 Spell of abnormal behavior      Current anticonvulsants:  None      Acquisition details:  Electroencephalography was acquired using a minimum of 21 channels on an [employer] Neurology system v 9.3.1 with electrode placement according to the standard International 10-20 system following ACNS (American Clinical Neurophysiology Society) guidelines for Long-Term Video EEG monitoring.  Anterior temporal T1 and T2 electrodes were utilized whenever possible.   The [employer] automated spike & seizure detections were all reviewed in detail, in addition to extensive portions of raw EEG.      Day 1  2023 from 01:47:06 to 10:00:00  Awake background:  The awake electrographic background was characterized by the presence of a well organized mixture of mainly alpha and some beta frequencies.  Fragments of a symmetric, well formed 9 to 10 Hz posterior dominant rhythm were present.  An anterior to posterior gradient was present.    Sleep background:  Drowsiness was characterized by attenuation of the posterior dominant rhythm, diffuse background slowing and symmetrical vertex waves.  Stage 2 sleep was characterized by the presence of synchronous and symmetrical sleep spindles. K complexes were present.  Slow wave sleep architecture was preserved, characterized by a mixture of moderate to high voltage delta waves with some faster frequencies.    Background slowing:  No generalized background slowing was present.    Focal slowing:  Intermittent polymorphic theta and delta slowing was present over the left temporal region.    Other paroxysmal non-epileptiform findings:    None.    Spontaneous activity:  No epileptiform discharges were present.    Activation procedures:  Photic stimulation maneuvers were not done.  Hyperventilation maneuvers were not done.    Clinical events:  No clinical events occurred on this date.  No electrographic or electroclinical seizures occurred on this date    Pushed button events:  The event button was not activated on this date, other than for testing or accidental purposes.    Day 1 impression until 10:00:00 on 2023  Abnormal tracing, due to the presence of:  1)	Intermittent polymorphic slowing, left temporal region    Day 1 clinical correlation until 10:00:00 on 2023  Abnormal tracing.  The above mentioned findings are indicative of the presence of mild focal cerebral dysfunction in the left temporal region.  No epileptiform discharges present.  No electrographic or electroclinical seizures occurred.      The undersigned attending physicians have reviewed portions of this record on the dates documented below:  On 2023 the study was reviewed from 2023 at 01:47:06 to 10:00:00 by Shankea Berumen MD

## 2023-11-12 NOTE — H&P ADULT - NSHPSOCIALHISTORY_GEN_ALL_CORE
SOCIAL HISTORY:   Patient lives in [place], visiting [place], flight to [place] on   Smoking status: denies, never  Drinkin glass of wine per day  Drug Use: denies

## 2023-11-12 NOTE — CONSULT NOTE ADULT - SUBJECTIVE AND OBJECTIVE BOX
77F PMH Atrial fibrillation (on Eliquis), R MCA aneurysm s/p coil embolization, breast Ca in remission presented to [facility] after a witnessed syncopal episode preceded by an episode of expressive aphasia with a short post ictal phase and CT findings concerning for focal moderate stenosis left PCA P2 segment. At bedside assessment she reports no new cardiopulmonary complaints, she is visiting from [place] and follows up with Dr Deepak Walsh (Cardiologist) and Dr Pola Torres (EP) for management of supraventricular tachycardia. She has undergone 3 ablations in the past as well as a cardioversion 1-2 months ago and was previously on metoprolol and flecainide as a "pill in pocket" strategy, however discontinued both medications once her heart rate was controlled. She reports having a resting heart rate of 40-60bpm however since her syncopal event, has been persistently in the 120s and asymptomatic. Cardiology consulted for persistent tachycardia.     All: none  Meds: ASA 81mg, Eliquis 2.5mg BID, Multivitamin, Cholecalciferol 25mcg   Discontinued medications: Amiodarone, Flecainide, Metoprolol.   PSH: s/p cerebral aneurysm repair    MEDICATIONS  (STANDING):  apixaban 2.5 milliGRAM(s) Oral every 12 hours  aspirin  chewable 81 milliGRAM(s) Oral daily  atorvastatin 80 milliGRAM(s) Oral at bedtime  calcium carbonate   1250 mG (OsCal) 1 Tablet(s) Oral daily  cholecalciferol 1000 Unit(s) Oral daily  influenza  Vaccine (HIGH DOSE) 0.7 milliLiter(s) IntraMuscular once  multivitamin 1 Tablet(s) Oral daily      Vital Signs Last 24 Hrs  T(C): 36.7 (12 Nov 2023 17:00), Max: 37.2 (12 Nov 2023 13:51)  T(F): 98.1 (12 Nov 2023 17:00), Max: 98.9 (12 Nov 2023 13:51)  HR: 123 (12 Nov 2023 18:19) (110 - 126)  BP: 106/76 (12 Nov 2023 18:19) (106/76 - 147/81)  BP(mean): 88 (12 Nov 2023 18:19) (88 - 102)  RR: 18 (12 Nov 2023 18:19) (16 - 18)  SpO2: 98% (12 Nov 2023 18:19) (94% - 100%)    Parameters below as of 12 Nov 2023 18:19  Patient On (Oxygen Delivery Method): room air        PHYSICAL EXAM:  GENERAL: Elderly  female in NAD, mild respiratory distress   HEAD:  Atraumatic, Normocephalic  EYES: EOMI, conjunctiva and sclera clear  NECK: Supple, No JVD  CHEST/LUNG: Clear to auscultation bilaterally; No wheeze  HEART: Regular rate and rhythm; No murmurs  ABDOMEN: Soft, Nontender, Nondistended; Bowel sounds present  EXTREMITIES:  2+ Peripheral Pulses, No LE edema  PSYCH: AAOx3  NEUROLOGY: non-focal  SKIN: Ankle small dark puncture wound, with surround reactive skin changes    INTERPRETATION OF TELEMETRY:     ECG:    I&O's Detail    12 Nov 2023 07:01  -  12 Nov 2023 19:21  --------------------------------------------------------  IN:    Oral Fluid: 600 mL  Total IN: 600 mL    OUT:  Total OUT: 0 mL    Total NET: 600 mL          LABS:                        12.6   5.77  )-----------( 154      ( 12 Nov 2023 06:13 )             37.7     11-12    138  |  104  |  14  ----------------------------<  95  4.0   |  24  |  0.64    Ca    9.0      12 Nov 2023 06:13  Phos  3.7     11-12  Mg     2.2     11-12    TPro  7.4  /  Alb  4.2  /  TBili  0.4  /  DBili  x   /  AST  19  /  ALT  34  /  AlkPhos  74  11-11        PT/INR - ( 11 Nov 2023 19:51 )   PT: 12.1 sec;   INR: 1.07          PTT - ( 11 Nov 2023 19:51 )  PTT:41.7 sec  Urinalysis Basic - ( 12 Nov 2023 06:13 )    Color: x / Appearance: x / SG: x / pH: x  Gluc: 95 mg/dL / Ketone: x  / Bili: x / Urobili: x   Blood: x / Protein: x / Nitrite: x   Leuk Esterase: x / RBC: x / WBC x   Sq Epi: x / Non Sq Epi: x / Bacteria: x      I&O's Summary    12 Nov 2023 07:01  -  12 Nov 2023 19:21  --------------------------------------------------------  IN: 600 mL / OUT: 0 mL / NET: 600 mL      BNP  RADIOLOGY & ADDITIONAL STUDIES:

## 2023-11-12 NOTE — SPEECH LANGUAGE PATHOLOGY EVALUATION - SLP DIAGNOSIS
Intact speech and language skills. Pt participated in complex conversation, presents with no marlene dysarthria, and demonstrates intact cognitive linguistic skills. Episode of aphasia was transient, and language skills have returned to baseline, per eval and pt report.

## 2023-11-12 NOTE — H&P ADULT - HISTORY OF PRESENT ILLNESS
**STROKE HPI***    HPI: 77y Female with PMHx of afib on Eliquis, hx of R MCA aneursym s/p coil embolization on ASA 81, breast CA in remission, presents to [facility] for TIA vs stroke evaluation. Pt presented to [place] after having episode of gibberish speech followed by brief syncopal episode with quick resolution of symptoms. Pt denies headache, visual disturbances, weakness/numbness in extremities, unsteady gait during this event. Pt states she felt hot, subsequently felt lightheaded like she was going to faint. Pt went to sit on a stool and then she cannot recall the event. Per her friend while she was sitting on the stool she had 2 episodes of speaking gibberish within 2 minutes. Pt then had a syncopal episode, bystanders laid her on the floor and patient then remembers waking up on the floor. Pt states as soon as she woke up she felt 90% back to normal. Once she arrived at [place], patient states she felt completely back to herself. Stroke code called at [place], NIHSS 0, CT imaging significant for focal moderate stenosis of left PCA P2 segment, s/p R MCA aneurysm coil embolization. Pt found to have elevated troponin in ED, EKG with sinus tachycardia, repeat troponin downtrended.

## 2023-11-12 NOTE — PATIENT PROFILE ADULT - NSPROEXTENSIONSOFSELF_GEN_A_NUR
"HPI     Post-op Evaluation     Comments: Ahmed OS 9/10/20              Comments     The patient states her left eye is doing better but the stitches are   bothering her.   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 Cristofer Worley pt     1. Glaucoma OU  Fhx of Glaucoma (2 brothers)  SLT OU 7/9/2020  Trab OU 4/19 in [place]  3x Bleb Needling OU (last one 6/2019)  Ahmed OS 9/10/20/ [id number]/REF/FP7/LOT   2. RK OU  3. PCIOL OU   Yag OU  4. H/o Iritis   MMC OU    OS- Durezol QID, Ilevro QD, Poly QID   OD: Latanoprost QHS (was supposed to switch to Rocklatan but was burning   so d/c)  OD LOTEMAX BID  OD: PF Cosopt BID,Zioptan QD          Last edited by Doc Merlos MD on 9/14/2020 11:31 AM. (History)            Assessment /Plan     For exam results, see Encounter Report.      ICD-10-CM ICD-9-CM    1. Post-operative state  Z98.890 V45.89 S/p AHMED OS - IOP MUCH BETTER TODAY OS      2. Primary open angle glaucoma (POAG) of both eyes, severe stage  H40.1133 365.11      365.73        OS- Durezol QID, Ilevro QD, Poly QID       OD LOTEMAX BID  OD: PF Cosopt BID,Zioptan QD      CONTINUE OINTMENT AS MUCH AS POSSIBLE   Return to clinic 4 DAYS             "
none

## 2023-11-12 NOTE — H&P ADULT - NSICDXPASTMEDICALHX_GEN_ALL_CORE_FT
PAST MEDICAL HISTORY:  Atrial fibrillation     History of breast cancer     History of intracranial aneurysm     Osteoporosis

## 2023-11-12 NOTE — CONSULT NOTE ADULT - ASSESSMENT
77F PMH Atrial fibrillation (on Eliquis) s/p multiple ablations and DCCV 9/2023, R MCA aneurysm s/p coil embolization, breast Ca in remission admitted for evaluation of a syncopal episode preceded by a short episode of expressive aphasia with subsequent persistent narrow complex tachycardia. Cardiology consulted for evaluation of persistent tachycardia.    Review of Studies:    CT 11/11/23: No arterial occlusion or high grade stenosis. Focal moderate stenosis of L PCA P2 segment. s/p stent-assisted coil embolization of R MCA bifurcation aneurysm; no gross recurrence given degree of streak artifact.    #Narrow Complex Tachycardia  No cardiopulmonary complaints at bedside assessment. Reports baseline HR 40-60bpm however now persistently 125-127 bpm on telemetry. She was previously on metoprolol and flecanide "pill in pocket" after her Her first attempted ablation on2/21/22. She is followed in [place], [place] by  Dr Deepak Walsh (Cardiologist) and Dr Pola Torres (EP) [phone].  - Suspect possible atrial tachyarrhythmia with HR sustained ~ 127 bpm since admission  - Recommend trial lopressor 12.5mg BID for rate control (Hold HR <60 SBP < 110)  - Recommend EP consult for evaluation of possible tachyarrhythmia  - Please obtain transthoracic echocardiogram    #pAF  - CHADSVASC 5(Age, Sex, TIA) - continue Eliquis  - Recommend trial of rate control with Lopressor 12.5mg BID (Hold HR <60, SBP <110)  - EP consult    #Syncope  Preceded by expressive aphasia and short post ictal phase with CT finding concerning for left PCA P2 moderate stenosis. Cannot rule out possibility of syncope related to arrhythmia/sinus node dysfunction.  - f/u TTE  - f/u EEG  - MRI pending   - EP consult in AM.      Recommendations finalized pending attending attestation.

## 2023-11-12 NOTE — H&P ADULT - NS ATTEND AMEND GEN_ALL_CORE FT
The patient is a 77 year old female with a PMH of a fib (on Eliquis 2.5 mg BID) and R MCA aneurysm s/p coil embolization admitted after an episode of syncope which was preceded by diaphoresis and lightheadedness as well as ?two episodes of “gibberish speech” because of which she is admitted for TIA eval. Given persistent tachycardiac and syncope (troponin now normal), will obtain CT PE protocol. Plan to obtain more collateral from friend who witnessed event. Continue EEG. Plan for MRI, TTE. Continue Eliquis, aspirin for now- may increase Eliquis to full dose pending eval. Continue statin.  Will reach out to patient’s cardiologist tomorrow as well.

## 2023-11-12 NOTE — H&P ADULT - NSHPADDITIONALINFOADULT_GEN_ALL_CORE
77 year old lady with PMH of A-fib (on reduced dose of apixaban 2.5 mg BID), R MCA aneursym s/p coil embolization on ASA 81 and breast CA in remission who initially presented after an episode of LOC associated with difficulties with the speech. History is significant for some vaso-vagal symptoms before the LOC such as diaphoresis, brief duration of LOC (around 2 min) and fast recovery - patient reports that she was back to normal almost immediately after awakening. There was no urinary incontinence, shaking or tongue biting - that was witnessed by her friend. Her friend also describes that during the episode of LOC patient was producing sounds. Therefore there was no clear word-finding difficulties. In ED CTH was negative and CTA showed left P2 stenosis (which is somewhat hard to appreciate on my read). Her exam was normal in ED and remains normal this AM. Notably, patient has elevated HR and mild downtrending troponemia.     Overall, given the constellation of the symptoms, CVA or seizure would be less likely. We performed cvEEG which was negative for epileptic activity. We would complete the stroke work up, including MRI and continue apixaban 2.5 mg (given the low BMI and patient is turning 78 soon). In the meantime, we would rule out PE with CTA chest and further investigate cardiogenic etiologies of the presentation.     Tulio Alvarado MD  Vascular Neurology Fellow

## 2023-11-12 NOTE — CONSULT NOTE ADULT - ASSESSMENT
77y Female with PMHx of afib on Eliquis, hx of R MCA aneurysm s/p coil embolization, breast CA in remission, presents to [facility] for TIA vs stroke workup.    #TIA  Resolution of neurological deficits leans toward TIA  - MR pending  - f/u TTE   - f/u EEG  - atorvastatin 80  - Eliquis   - further work up per Neuro     #HLD  , goal <70  - c/w Atorva 80     #Ankle wound +rash, unclear etiology  Appears to be healed insect bite  - consider dermatology consult for biopsy if indicated    #Tachycardia   Unclear if sinus tach, vs possible long-RP tachycardia   - cardiology consult pending   - Encourage adequate oral intake  77y Female with PMHx of afib on Eliquis, hx of R MCA aneurysm s/p coil embolization, breast CA in remission, presents to [facility] for TIA vs stroke vs syncope workup.    #TIA vs Syncope   Resolution of neurological deficits leans toward TIA  - MR pending  - f/u TTE   - f/u EEG  - atorvastatin 80  - Eliquis   - consider orthostatics   - further syncope work up per Neuro     #HLD  , goal <70  - c/w Atorva 80     #Ankle wound +rash, unclear etiology  Appears to be healed insect bite  - consider dermatology consult for biopsy if indicated    #Tachycardia   Unclear if sinus tach, vs possible long-RP tachycardia   - EP consult pending   - Encourage adequate oral intake

## 2023-11-13 DIAGNOSIS — I48.92 UNSPECIFIED ATRIAL FLUTTER: ICD-10-CM

## 2023-11-13 LAB
ANION GAP SERPL CALC-SCNC: 8 MMOL/L — SIGNIFICANT CHANGE UP (ref 5–17)
ANION GAP SERPL CALC-SCNC: 8 MMOL/L — SIGNIFICANT CHANGE UP (ref 5–17)
BUN SERPL-MCNC: 16 MG/DL — SIGNIFICANT CHANGE UP (ref 7–23)
BUN SERPL-MCNC: 16 MG/DL — SIGNIFICANT CHANGE UP (ref 7–23)
CALCIUM SERPL-MCNC: 8.6 MG/DL — SIGNIFICANT CHANGE UP (ref 8.4–10.5)
CALCIUM SERPL-MCNC: 8.6 MG/DL — SIGNIFICANT CHANGE UP (ref 8.4–10.5)
CHLORIDE SERPL-SCNC: 106 MMOL/L — SIGNIFICANT CHANGE UP (ref 96–108)
CHLORIDE SERPL-SCNC: 106 MMOL/L — SIGNIFICANT CHANGE UP (ref 96–108)
CO2 SERPL-SCNC: 25 MMOL/L — SIGNIFICANT CHANGE UP (ref 22–31)
CO2 SERPL-SCNC: 25 MMOL/L — SIGNIFICANT CHANGE UP (ref 22–31)
CREAT SERPL-MCNC: 0.64 MG/DL — SIGNIFICANT CHANGE UP (ref 0.5–1.3)
CREAT SERPL-MCNC: 0.64 MG/DL — SIGNIFICANT CHANGE UP (ref 0.5–1.3)
EGFR: 90 ML/MIN/1.73M2 — SIGNIFICANT CHANGE UP
EGFR: 90 ML/MIN/1.73M2 — SIGNIFICANT CHANGE UP
GLUCOSE SERPL-MCNC: 87 MG/DL — SIGNIFICANT CHANGE UP (ref 70–99)
GLUCOSE SERPL-MCNC: 87 MG/DL — SIGNIFICANT CHANGE UP (ref 70–99)
HCT VFR BLD CALC: 37.9 % — SIGNIFICANT CHANGE UP (ref 34.5–45)
HCT VFR BLD CALC: 37.9 % — SIGNIFICANT CHANGE UP (ref 34.5–45)
HGB BLD-MCNC: 12.6 G/DL — SIGNIFICANT CHANGE UP (ref 11.5–15.5)
HGB BLD-MCNC: 12.6 G/DL — SIGNIFICANT CHANGE UP (ref 11.5–15.5)
MAGNESIUM SERPL-MCNC: 2 MG/DL — SIGNIFICANT CHANGE UP (ref 1.6–2.6)
MAGNESIUM SERPL-MCNC: 2 MG/DL — SIGNIFICANT CHANGE UP (ref 1.6–2.6)
MCHC RBC-ENTMCNC: 33.2 GM/DL — SIGNIFICANT CHANGE UP (ref 32–36)
MCHC RBC-ENTMCNC: 33.2 GM/DL — SIGNIFICANT CHANGE UP (ref 32–36)
MCHC RBC-ENTMCNC: 34.2 PG — HIGH (ref 27–34)
MCHC RBC-ENTMCNC: 34.2 PG — HIGH (ref 27–34)
MCV RBC AUTO: 103 FL — HIGH (ref 80–100)
MCV RBC AUTO: 103 FL — HIGH (ref 80–100)
NRBC # BLD: 0 /100 WBCS — SIGNIFICANT CHANGE UP (ref 0–0)
NRBC # BLD: 0 /100 WBCS — SIGNIFICANT CHANGE UP (ref 0–0)
PHOSPHATE SERPL-MCNC: 3.9 MG/DL — SIGNIFICANT CHANGE UP (ref 2.5–4.5)
PHOSPHATE SERPL-MCNC: 3.9 MG/DL — SIGNIFICANT CHANGE UP (ref 2.5–4.5)
PLATELET # BLD AUTO: 153 K/UL — SIGNIFICANT CHANGE UP (ref 150–400)
PLATELET # BLD AUTO: 153 K/UL — SIGNIFICANT CHANGE UP (ref 150–400)
POTASSIUM SERPL-MCNC: 4.2 MMOL/L — SIGNIFICANT CHANGE UP (ref 3.5–5.3)
POTASSIUM SERPL-MCNC: 4.2 MMOL/L — SIGNIFICANT CHANGE UP (ref 3.5–5.3)
POTASSIUM SERPL-SCNC: 4.2 MMOL/L — SIGNIFICANT CHANGE UP (ref 3.5–5.3)
POTASSIUM SERPL-SCNC: 4.2 MMOL/L — SIGNIFICANT CHANGE UP (ref 3.5–5.3)
RBC # BLD: 3.68 M/UL — LOW (ref 3.8–5.2)
RBC # BLD: 3.68 M/UL — LOW (ref 3.8–5.2)
RBC # FLD: 13 % — SIGNIFICANT CHANGE UP (ref 10.3–14.5)
RBC # FLD: 13 % — SIGNIFICANT CHANGE UP (ref 10.3–14.5)
SODIUM SERPL-SCNC: 139 MMOL/L — SIGNIFICANT CHANGE UP (ref 135–145)
SODIUM SERPL-SCNC: 139 MMOL/L — SIGNIFICANT CHANGE UP (ref 135–145)
WBC # BLD: 5.63 K/UL — SIGNIFICANT CHANGE UP (ref 3.8–10.5)
WBC # BLD: 5.63 K/UL — SIGNIFICANT CHANGE UP (ref 3.8–10.5)
WBC # FLD AUTO: 5.63 K/UL — SIGNIFICANT CHANGE UP (ref 3.8–10.5)
WBC # FLD AUTO: 5.63 K/UL — SIGNIFICANT CHANGE UP (ref 3.8–10.5)

## 2023-11-13 PROCEDURE — 93306 TTE W/DOPPLER COMPLETE: CPT | Mod: 26

## 2023-11-13 PROCEDURE — 99232 SBSQ HOSP IP/OBS MODERATE 35: CPT

## 2023-11-13 PROCEDURE — 95720 EEG PHY/QHP EA INCR W/VEEG: CPT

## 2023-11-13 RX ORDER — METOPROLOL TARTRATE 50 MG
25 TABLET ORAL EVERY 12 HOURS
Refills: 0 | Status: DISCONTINUED | OUTPATIENT
Start: 2023-11-13 | End: 2023-11-13

## 2023-11-13 RX ORDER — METOPROLOL TARTRATE 50 MG
12.5 TABLET ORAL ONCE
Refills: 0 | Status: COMPLETED | OUTPATIENT
Start: 2023-11-13 | End: 2023-11-13

## 2023-11-13 RX ORDER — METOPROLOL TARTRATE 50 MG
12.5 TABLET ORAL ONCE
Refills: 0 | Status: DISCONTINUED | OUTPATIENT
Start: 2023-11-13 | End: 2023-11-13

## 2023-11-13 RX ORDER — METOPROLOL TARTRATE 50 MG
25 TABLET ORAL DAILY
Refills: 0 | Status: DISCONTINUED | OUTPATIENT
Start: 2023-11-13 | End: 2023-11-14

## 2023-11-13 RX ADMIN — Medication 25 MILLIGRAM(S): at 20:19

## 2023-11-13 RX ADMIN — Medication 12.5 MILLIGRAM(S): at 14:56

## 2023-11-13 RX ADMIN — Medication 1 TABLET(S): at 11:28

## 2023-11-13 RX ADMIN — Medication 3 MILLIGRAM(S): at 22:01

## 2023-11-13 RX ADMIN — APIXABAN 2.5 MILLIGRAM(S): 2.5 TABLET, FILM COATED ORAL at 17:41

## 2023-11-13 RX ADMIN — Medication 1000 UNIT(S): at 11:29

## 2023-11-13 RX ADMIN — Medication 81 MILLIGRAM(S): at 11:28

## 2023-11-13 RX ADMIN — APIXABAN 2.5 MILLIGRAM(S): 2.5 TABLET, FILM COATED ORAL at 05:03

## 2023-11-13 RX ADMIN — Medication 12.5 MILLIGRAM(S): at 05:03

## 2023-11-13 NOTE — CONSULT NOTE ADULT - SUBJECTIVE AND OBJECTIVE BOX
Patient is a 77y old  Female who presents with a chief complaint of TIA (12 Nov 2023 19:20)      HPI:   **STROKE HPI***    HPI: 77y Female with PMHx of afib on Eliquis, hx of R MCA aneursym s/p coil embolization on ASA 81, breast CA in remission, presents to [facility] for TIA vs stroke evaluation. Pt presented to [facility] after having episode of gibberish speech followed by brief syncopal episode with quick resolution of symptoms. Pt denies headache, visual disturbances, weakness/numbness in extremities, unsteady gait during this event. Pt states she felt hot, subsequently felt lightheaded like she was going to faint. Pt went to sit on a stool and then she cannot recall the event. Per her friend while she was sitting on the stool she had 2 episodes of speaking gibberish within 2 minutes. Pt then had a syncopal episode, bystanders laid her on the floor and patient then remembers waking up on the floor. Pt states as soon as she woke up she felt 90% back to normal. Once she arrived at [facility], patient states she felt completely back to herself. Stroke code called at [facility], NIHSS 0, CT imaging significant for focal moderate stenosis of left PCA P2 segment, s/p R MCA aneurysm coil embolization. Pt found to have elevated troponin in ED, EKG with sinus tachycardia, repeat troponin downtrended.       (12 Nov 2023 02:04)    PAST MEDICAL & SURGICAL HISTORY:  Atrial fibrillation      History of intracranial aneurysm      Osteoporosis      History of breast cancer      S/P cerebral aneurysm repair        MEDICATIONS  (STANDING):  apixaban 2.5 milliGRAM(s) Oral every 12 hours  aspirin  chewable 81 milliGRAM(s) Oral daily  atorvastatin 80 milliGRAM(s) Oral at bedtime  calcium carbonate   1250 mG (OsCal) 1 Tablet(s) Oral daily  cholecalciferol 1000 Unit(s) Oral daily  influenza  Vaccine (HIGH DOSE) 0.7 milliLiter(s) IntraMuscular once  melatonin 3 milliGRAM(s) Oral at bedtime  metoprolol tartrate 12.5 milliGRAM(s) Oral two times a day  multivitamin 1 Tablet(s) Oral daily    MEDICATIONS  (PRN):          FAMILY HISTORY:  No pertinent family history in first degree relatives        CBC Full  -  ( 13 Nov 2023 05:30 )  WBC Count : 5.63 K/uL  RBC Count : 3.68 M/uL  Hemoglobin : 12.6 g/dL  Hematocrit : 37.9 %  Platelet Count - Automated : 153 K/uL  Mean Cell Volume : 103.0 fl  Mean Cell Hemoglobin : 34.2 pg  Mean Cell Hemoglobin Concentration : 33.2 gm/dL  Auto Neutrophil # : x  Auto Lymphocyte # : x  Auto Monocyte # : x  Auto Eosinophil # : x  Auto Basophil # : x  Auto Neutrophil % : x  Auto Lymphocyte % : x  Auto Monocyte % : x  Auto Eosinophil % : x  Auto Basophil % : x      11-13    139  |  106  |  16  ----------------------------<  87  4.2   |  25  |  0.64    Ca    8.6      13 Nov 2023 05:30  Phos  3.9     11-13  Mg     2.0     11-13    TPro  7.4  /  Alb  4.2  /  TBili  0.4  /  DBili  x   /  AST  19  /  ALT  34  /  AlkPhos  74  11-11      Urinalysis Basic - ( 13 Nov 2023 05:30 )    Color: x / Appearance: x / SG: x / pH: x  Gluc: 87 mg/dL / Ketone: x  / Bili: x / Urobili: x   Blood: x / Protein: x / Nitrite: x   Leuk Esterase: x / RBC: x / WBC x   Sq Epi: x / Non Sq Epi: x / Bacteria: x        Radiology :     < from: CT Brain Stroke Protocol (11.11.23 @ 20:03) >  ACC: [id number] EXAM:  CT BRAIN STROKE PROTOCOL   ORDERED BY: WILNER CORBIN     PROCEDURE DATE:  11/11/2023          INTERPRETATION:  PROCEDURE: CT head without intravenous contrast    INDICATIONS: Aphasia, syncopal episode. Stroke code.    TECHNIQUE:  Serial axial images were obtained from the skull base to the   vertex without the use of intravenous contrast. Coronal and sagittal   reformatted images were obtained. Rapid-Hairdressr software is utilized for   preliminary hemorrhage detection.    COMPARISON EXAMINATION: None.    FINDINGS:  VENTRICLES AND SULCI: There is mild parenchymal volume loss. No   hydrocephalus.  INTRA-AXIAL: Portions of the right temporal lobe and basal ganglia are   obscured by coil artifact. Within this limitation, no acute intracranial   hemorrhage identified. No mass effect or midline shift. The gray-white   matter differentiation is grossly preserved. White matter small vessel   ischemic low-density is present. There are a couple small lacunar   infarcts in the basal ganglia, strictly age-indeterminate without prior   comparison study.  EXTRA-AXIAL: No extra-axial fluid collection is present. The patient is   status post coil embolization of right MCA aneurysm and the tines of a   stent are visible.  VISUALIZED SINUSES: Mild mucosal thickening right maxillary sinus with   small layering mucus.  VISUALIZED MASTOIDS:  Clear.  CALVARIUM:  No fracture.  MISCELLANEOUS:  Replacement of native lenses bilaterally.      IMPRESSION:    No acute intracranial hemorrhage or demarcated transcortical infarction.    White matter and age-indeterminate basal ganglia sites of   microangiopathic disease.      < from: CT Brain Perfusion Maps Stroke (11.11.23 @ 20:16) >  ACC: [id number] EXAM:  CT ANGIO BRAIN STROKE PROTC IC   ORDERED BY: WILNER CORBIN     ACC: [id number] EXAM:  CT ANGIO NECK STROKE PROTCL IC   ORDERED BY: WILNER CORBIN     ACC: [id number] EXAM:  CT BRAIN PERFUSION MAPS STROKE   ORDERED BY: WILNER CORBIN     PROCEDURE DATE:  11/11/2023          INTERPRETATION:  PROCEDURES:  CT perfusion with contrast  CTA brain with intravenous contrast  CTA neck with intravenous contrast    CLINICAL INDICATION: Aphasia, syncopal episode. Stroke code.    TECHNIQUE:    Afterthe bolus administration of 45 cc Omnipaque-350, 5 cc discarded, CT   perfusion is obtained as per [facility] protocol. Perfusion maps   are provided.    Multiple axial thin section were obtained through the intracranial and   cervical vasculature following the additional intravenous bolus injection   of 85 cc Omnipaque-350, 5 cc discarded. MIP series are provided.    COMPARISON STUDIES: None    FINDINGS:    CT PERFUSION:    A small focus of Tmax elevation is reported the medulla (3 cc volume),   likely spurious the inferior scan limits. No territorial deficit. No   cerebral blood flow or volume defect.    CTA BRAIN:  The internal carotid arteries are patent at the skull base and   intracranial compartment, without occlusion or high grade stenosis. The   anterior and middle cerebral arteries are patent at their 1st and 2nd   order segments, and appear symmetric caliber. There is coil embolization,   stent assisted, of the right MCA bifurcation. Streak artifact limits fine   inspection of the bifurcation and proximal M2 trunks for stenotic disease   or recurrent aneurysm, though not suspected    The posterior circulation shows no high grade stenosis or occlusion. Both   intracranial vertebral arteries, the basilar artery and bothposterior   cerebral arteries are patent. Focal moderate stenosis involves the left   posterior cerebral artery P2 segment.    There is no site of aneurysm within the resolution limitations of CTA.    CTA NECK:    The aortic arch is normal size and shows patency, with normal 3 vessel   configuration and no high-grade great vessel stenosis, despite calcified   plaque. Proximal left subclavian artery is narrowed by approximately 50%.    Both common carotid arteries are patent and nonstenotic up thethe   bifurcations where there is moderate calcified plaque.  Left ICA: Normal caliber and nonstenotic despite plaque at origin. No   filling defect or evidence of dissection.  Right ICA: Normal caliber and nonstenotic despite plaque at origin. No   filling defect or evidence of dissection.    Vertebral arteries: Patent throughout from origin to foramen magnum, mild   to moderately narrowed in the right origin from focal plaque. No   hemodynamically significant stenosis or evidence of dissection.      IMPRESSION:    CT PERFUSION: No territorial perfusion deficit.    CTA INTRACRANIAL:  1. No arterial occlusion or high grade stenosis.  2. Focal moderate stenosis of left PCA P2 segment.  3. Status post stent-assisted coil embolization of right MCA bifurcation   aneurysm; no gross recurrence given degree of streak artifact.    CTA EXTRACRANIAL:  No carotid or vertebral artery steno-occlusive disease, despite   atherosclerotic plaque, nor evidence of dissection.          Review of Systems : per HPI         Vital Signs Last 24 Hrs  T(C): 36.4 (13 Nov 2023 04:30), Max: 37.2 (12 Nov 2023 13:51)  T(F): 97.6 (13 Nov 2023 04:30), Max: 98.9 (12 Nov 2023 13:51)  HR: 95 (13 Nov 2023 08:37) (92 - 126)  BP: 99/63 (13 Nov 2023 08:37) (91/63 - 127/74)  BP(mean): 76 (13 Nov 2023 08:37) (73 - 96)  RR: 18 (13 Nov 2023 08:37) (17 - 21)  SpO2: 96% (13 Nov 2023 08:37) (95% - 98%)    Parameters below as of 13 Nov 2023 08:37  Patient On (Oxygen Delivery Method): room air            Physical Exam :  77 y o woman with EEG wrap , lying comfortably in semi Laboy's position , awake , alert , no acute complaints     Head : normocephalic , atraumatic    Eyes : PERRLA , EOMI , no nystagmus , sclera anicteric    ENT / FACE : neg nasal discharge , uvula midline , no oropharyngeal erythema / exudate    Neck : supple , negative JVD , negative carotid bruits , no thyromegaly    Chest : CTA bilaterally , neg wheeze / rhonchi / rales / crackles / egophany    Cardiovascular : regular rate and rhythm , neg murmurs / rubs / gallops    Abdomen : soft , non distended , non tender to palpation in all 4 quadrants ,  normal bowel sounds     Extremities : WWP , neg cyanosis /clubbing / edema     Neurologic Exam :     Alert and oriented to person , place , date/year , speech fluent w/o dysarthria , follows commands , recent and remote memory intact , repetition intact , comprehension intact ,  attention/concentration intact , fund of knowledge appropriate    Cranial Nerves:           II :                         pupils equal , round and reactive to light , visual fields intact         III/ IV/VI :              extraocular movements intact , neg nystagmus , neg ptosis        V :                        facial sensation intact , V1-3 normal        VII :                      face symmetric , no droop , normal eye closure and smile        VIII :                     hearing intact to finger rub bilaterally        IX and X :             no hoarseness , gag intact , palate/ uvula rise symmetrically        XI :                       SCM / trapezius strength intact bilateral        XII :                      no tongue deviation       Motor Exam:                Right UE:                     5/5 :           5/5 :   wrist extensors/ flexors        5/5 :   biceps        5/5 :   triceps        5/5 :   deltoid          negative pronator drift    Left UE:                       5/5 :           5/5 :   wrist extensors/ flexors        5/5 :   biceps        5/5 :   triceps        5/5 :   deltoid          negative pronator drift        Right LE:          5/5 : dorsiflexors         5/5 : plantar flexors        5/5 : quadriceps        5/5 : hamstrings        5/5 : hip flexors      Left LE:          5/5 : dorsiflexors         5/5 : plantar flexors        5/5 : quadriceps        5/5 : hamstrings        5/5 : hip flexors      Sensation :         intact to light touch x 4 extremities                            no neglect or extinction on double simultaneous testing    DTR :           biceps/brachioradialis : equal                            patella/ankle : equal           neg Babinski    Coordination :            Finger to Nose :  neg dysmetria bilaterally            Gait :  not tested          PM&R Impression : admitted for c/o gibberish speech followed by brief syncopal episode with quick resolution of symptoms    - no acute pathology on CT brain imaging , MRI pending     - no focal neurologic deficits         Recommendations / Plan :       1) Physical / Occupational therapy focusing on therapeutic exercises , equipment evaluation , bed mobility/transfer out of bed evaluation , progressive ambulation with assistive devices prn .    2) Current disposition plan recommendation  :    pending functional progress , probable d/c home

## 2023-11-13 NOTE — OCCUPATIONAL THERAPY INITIAL EVALUATION ADULT - ADDITIONAL COMMENTS
Pt resides in [place] in [facility], is right handed, wears glasses for driving and movies. Prior to admit, pt reports being independent with all ADLs/IADLs, has both a tub and shower, and independent for mobility, did not require any DME or ADs.

## 2023-11-13 NOTE — PHYSICAL THERAPY INITIAL EVALUATION ADULT - MODALITIES TREATMENT COMMENTS
CN testing - face symmetric, tongue midline, vision intact with smooth pursuit, peripheral fields intact

## 2023-11-13 NOTE — EEG REPORT - NS EEG TEXT BOX
EEG Report    Day 2: 11/12/2023 @ 7 AM to 11/13/2023 @ 7 AM    Pertinent medications: no changes  Background:  continuous, with predominantly alpha and beta frequencies.  Symmetry:  Occasional (1-9%) left temporal (T1, T3) and right temporal (T2, T4) polymorphic theta and delta frequency slowing.  Posterior Dominant Rhythm:  9 Hz symmetric, well-organized, and well-modulated.  Organization: Appropriate anterior-posterior gradient.  Voltage:  Normal (20+ uV)  Variability: Yes. 		Reactivity: Yes.  N2 sleep: Symmetric, synchronous spindles and K complexes.  Spontaneous Activity:  No epileptiform discharges.  Periodic/rhythmic activity:  None  Events:  No electrographic seizures or significant clinical events.  Provocations:  Hyperventilation and Photic stimulation: was not performed.    Daily Summary:    Occasional left and right temporal focal slowing, indicating a mild degree of focal cerebral dysfunction in these regions.  No epileptiform activity and no significant clinical events occurred.          Janet James MD  Attending Neurologist, [facility]   EEG Report    Day 2: 11/12/2023 @ 7 AM to 11/13/2023 @ 9:30 AM    Pertinent medications: no changes  Background:  continuous, with predominantly alpha and beta frequencies.  Symmetry:  Occasional (1-9%) left temporal (T1, T3) and right temporal (T2, T4) polymorphic theta and delta frequency slowing.  Posterior Dominant Rhythm:  9 Hz symmetric, well-organized, and well-modulated.  Organization: Appropriate anterior-posterior gradient.  Voltage:  Normal (20+ uV)  Variability: Yes. 		Reactivity: Yes.  N2 sleep: Symmetric, synchronous spindles and K complexes.  Spontaneous Activity:  No epileptiform discharges.  Periodic/rhythmic activity:  None  Events:  No electrographic seizures or significant clinical events.  Provocations:  Hyperventilation and Photic stimulation: was not performed.    Daily Summary:    Occasional left and right temporal focal slowing, indicating a mild degree of focal cerebral dysfunction in these regions.  No epileptiform activity and no significant clinical events occurred.          Janet James MD  Attending Neurologist, [facility]

## 2023-11-13 NOTE — DISCHARGE NOTE PROVIDER - PROVIDER TOKENS
FREE:[LAST:[Jillian],FIRST:[Deepak Hankins],PHONE:[phone]],FAX:[(   )    -]],FREE:[LAST:[Brian],FIRST:[Pola Franco],PHONE:[phone]],FAX:[(   )    -]]

## 2023-11-13 NOTE — OCCUPATIONAL THERAPY INITIAL EVALUATION ADULT - GENERAL OBSERVATIONS, REHAB EVAL
MRS 0. Pt received semi-supine in bed, +tele, +heplock, in NAD and agreeable to OT. PT Hailey present.

## 2023-11-13 NOTE — DISCHARGE NOTE PROVIDER - HOSPITAL COURSE
Hospital course:  77y Female with PMHx of afib on Eliquis, hx of R MCA aneurysm s/p coil embolization on ASA 81, breast CA in remission, presents to [facility] for TIA vs stroke evaluation. Pt presented to [facility] after having 2 episodes of gibberish speech followed by brief syncopal episode with quick resolution of symptoms. Stroke code called at [facility], NIHSS 0, CT imaging significant for focal moderate stenosis of left PCA P2 segment, s/p R MCA aneurysm coil embolization. Pt found to have elevated troponin in ED, EKG with sinus tachycardia, repeat troponin downtrended. Cardiology consulted in ED, no further cardiac workup indicated. Pt admitted to stroke tele for further management. Continued home Eliquis and ASA. Cardiology consulted in house for persistent tachycardia despite trial of IVF, recommended starting Metoprolol 12.5 mg PO BID and EP consult. EP was consulted concern for atrial tachycardia/aflutter. Recommended increased Metroprolol to 25 mg BID. ECHO       Discharge NIHSS:     During this hospital course, patient had a (ischemic/hemorrhagic) stroke located in (left/right.....) as seen on (MRI/CT).   The stroke etiology is likely secondary to:  []atrial fibrillation  []small vessel disease from atherosclerotic risk factors  []other:  []etiology workup still in progress    Patient had the following workup done in house:  CT Brain Stroke Protocol (11.11.23 @ 20:03)   IMPRESSION: No acute intracranial hemorrhage or demarcated transcortical infarction.  White matter and age-indeterminate basal ganglia sites of   microangiopathic disease.    CT PERFUSION: No territorial perfusion deficit.    CTA INTRACRANIAL:  1. No arterial occlusion or high grade stenosis.  2. Focal moderate stenosis of left PCA P2 segment.  3. Status post stent-assisted coil embolization of right MCA bifurcation   aneurysm; no gross recurrence given degree of streak artifact.    CTA EXTRACRANIAL:  No carotid or vertebral artery steno-occlusive disease, despite   atherosclerotic plaque, nor evidence of dissection.    TTE Echo Complete w/o Contrast w/ Doppler (11.13.23 @ 13:00)   CONCLUSIONS:   1. Normal left ventricular size and systolic function.   2. Moderate symmetric left ventricular hypertrophy.   3. Grade II left ventricular diastolic dysfunction.   4. Normal right ventricular size and systolic function.   5. Moderately dilated left atrium.   6. Mild mitral regurgitation.   7. Mild tricuspid regurgitation.   8. Pulmonary artery systolic pressure is 35 mmHg.   9. No pericardial effusion.  10. Mildly dilated aortic root.  11. Patient was tachycardic during the study.  12. No prior echo is available for comparison.    [x]labs  A1C 5.4    TSH 1.83    Physical exam at discharge:  -Mental status: Awake, alert, oriented to person, place, and time. Speech is fluent with intact naming, repetition, and comprehension, no dysarthria. Recent and remote memory intact. Follows commands. Attention/concentration intact. Fund of knowledge appropriate.  -Cranial nerves:   II: Visual fields are full to confrontation.  III, IV, VI: Extraocular movements are intact without nystagmus. Pupils equally round and reactive to light  V:  Facial sensation V1-V3 equal and intact   VII: Face is symmetric with normal eye closure and smile  VIII: Hearing is bilaterally intact to finger rub  XII: Tongue protrudes midline  Motor: Normal bulk and tone. No pronator drift. Strength bilateral upper extremity 5/5, bilateral lower extremities 5/5.  Sensation: Intact to light touch bilaterally. No neglect or extinction on double simultaneous testing.  Coordination: No dysmetria on finger-to-nose bilaterally  Gait: Narrow gait and steady    New medications on discharge: Metoprolol  Labs to be followed up:  Imaging to be done as outpatient:  Further outpatient workup:   Hospital course:  77y Female with PMHx of afib on Eliquis, hx of R MCA aneurysm s/p coil embolization on ASA 81, breast CA in remission, presents to [facility] for TIA vs stroke evaluation. Pt presented to [facility] after having 2 episodes of gibberish speech followed by brief syncopal episode with quick resolution of symptoms. Stroke code called at [facility], NIHSS 0, CT imaging significant for focal moderate stenosis of left PCA P2 segment, s/p R MCA aneurysm coil embolization. Pt found to have elevated troponin in ED, EKG with sinus tachycardia, repeat troponin downtrended. Cardiology consulted in ED, no further cardiac workup indicated. Pt admitted to stroke tele for further management. Continued home Eliquis and ASA. Cardiology consulted in house for persistent tachycardia despite trial of IVF, recommended starting Metoprolol 12.5 mg PO BID and EP consult. EP was consulted concern for atrial tachycardia/aflutter, Metoprolol increased to 25 mg PO BID for better rate control. MRI negative for acute ischemic event. Etiology of syncope likely cardiac in nature, went for AYSHA/DCCV with EP. Home Eliquis increased to 5 mg as patient meets criteria for 5 mg dosing. Plan to discharge home on Eliquis, ASA, and have patient follow up with her EP/cardiologist once she gets back to [place].    During this hospital course, patient did not have a stroke.     Patient had the following workup done in house:  CT Brain Stroke Protocol (11.11.23 @ 20:03)   IMPRESSION: No acute intracranial hemorrhage or demarcated transcortical infarction.  White matter and age-indeterminate basal ganglia sites of   microangiopathic disease.    CT PERFUSION: No territorial perfusion deficit.    CTA INTRACRANIAL:  1. No arterial occlusion or high grade stenosis.  2. Focal moderate stenosis of left PCA P2 segment.  3. Status post stent-assisted coil embolization of right MCA bifurcation   aneurysm; no gross recurrence given degree of streak artifact.    CTA EXTRACRANIAL:  No carotid or vertebral artery steno-occlusive disease, despite   atherosclerotic plaque, nor evidence of dissection.    TTE Echo Complete w/o Contrast w/ Doppler (11.13.23 @ 13:00)   CONCLUSIONS:   1. Normal left ventricular size and systolic function.   2. Moderate symmetric left ventricular hypertrophy.   3. Grade II left ventricular diastolic dysfunction.   4. Normal right ventricular size and systolic function.   5. Moderately dilated left atrium.   6. Mild mitral regurgitation.   7. Mild tricuspid regurgitation.   8. Pulmonary artery systolic pressure is 35 mmHg.   9. No pericardial effusion.  10. Mildly dilated aortic root.  11. Patient was tachycardic during the study.  12. No prior echo is available for comparison.    AYSHA w/Doppler (11.14.23 @ 15:14)   CONCLUSIONS:   1. Limited study obtained for evaluation of atria and its appendages   prior to DCCV.   2. Left ventricular endocardium is not well visualized. Grossly, left   ventricular function appears normal.   3. No LA/RA/LIV/RAA thrombus seen.   4. Trivial pericardial effusion.   5. There is severe non-mobile plaque seen in the visualized portion of   the descending aorta. There is moderate non-mobile plaque seen in the   visualized portion of the aortic arch.    [x]labs  A1C 5.4    TSH 1.83    Physical exam at discharge:  -Mental status: Awake, alert, oriented to person, place, and time. Speech is fluent with intact naming, repetition, and comprehension, no dysarthria. Recent and remote memory intact. Follows commands. Attention/concentration intact. Fund of knowledge appropriate.  -Cranial nerves:   II: Visual fields are full to confrontation.  III, IV, VI: Extraocular movements are intact without nystagmus. Pupils equally round and reactive to light  V:  Facial sensation V1-V3 equal and intact   VII: Face is symmetric with normal eye closure and smile  VIII: Hearing is bilaterally intact to finger rub  XII: Tongue protrudes midline  Motor: Normal bulk and tone. No pronator drift. Strength bilateral upper extremity 5/5, bilateral lower extremities 5/5.  Sensation: Intact to light touch bilaterally. No neglect or extinction on double simultaneous testing.  Coordination: No dysmetria on finger-to-nose bilaterally  Gait: Narrow gait and steady    Discharge NIHSS: 0     New medications on discharge: Eliquis 5 mg PO BID  Labs to be followed up: N/A  Imaging to be done as outpatient: N/A  Further outpatient workup: outpatient follow up with your cardiologist/EP once you return back to [place]   Hospital course:  77y Female with PMHx of afib on Eliquis, hx of R MCA aneurysm s/p coil embolization on ASA 81, breast CA in remission, presents to [facility] for TIA vs stroke evaluation. Pt presented to [facility] after having 2 episodes of gibberish speech followed by brief syncopal episode with quick resolution of symptoms. Stroke code called at [facility], NIHSS 0, CT imaging significant for focal moderate stenosis of left PCA P2 segment, s/p R MCA aneurysm coil embolization. Pt found to have elevated troponin in ED, EKG with sinus tachycardia, repeat troponin downtrended. Cardiology consulted in ED, no further cardiac workup indicated. Pt admitted to stroke tele for further management. Continued home Eliquis and ASA. Cardiology consulted in house for persistent tachycardia despite trial of IVF and negative CT PE study, recommended starting Metoprolol 12.5 mg PO BID and EP consult. EP was consulted concern for atrial tachycardia/aflutter, Metoprolol increased to 25 mg PO BID for better rate control. MRI negative for acute ischemic event. Etiology of syncope likely cardiac in nature, went for AYSHA/DCCV with EP. Home Eliquis increased to 5 mg as patient meets criteria for 5 mg dosing. Returned back from [facility] with HR in the 60s, in NSR on cardiac monitor. Plan to discharge home on Eliquis, ASA, and have patient follow up with her EP/cardiologist once she gets back to [place]. Rx sent for Lipitor 20 mg however patient would rather follow up with her cardiologist for further management.    During this hospital course, patient did not have a stroke.     Patient had the following workup done in house:  CT Brain Stroke Protocol (11.11.23 @ 20:03)   IMPRESSION: No acute intracranial hemorrhage or demarcated transcortical infarction.  White matter and age-indeterminate basal ganglia sites of   microangiopathic disease.    CT PERFUSION: No territorial perfusion deficit.    CTA INTRACRANIAL:  1. No arterial occlusion or high grade stenosis.  2. Focal moderate stenosis of left PCA P2 segment.  3. Status post stent-assisted coil embolization of right MCA bifurcation   aneurysm; no gross recurrence given degree of streak artifact.    CTA EXTRACRANIAL:  No carotid or vertebral artery steno-occlusive disease, despite   atherosclerotic plaque, nor evidence of dissection.      CT Angio Chest PE Protocol w/ IV Cont (11.12.23 @ 12:28)   IMPRESSION: No pulmonary embolism.  3 mm right lower lobe pulmonary nodule. Follow-up with noncontrast chest   CT can be performed for further evaluation as clinically warranted.    TTE Echo Complete w/o Contrast w/ Doppler (11.13.23 @ 13:00)   CONCLUSIONS:   1. Normal left ventricular size and systolic function.   2. Moderate symmetric left ventricular hypertrophy.   3. Grade II left ventricular diastolic dysfunction.   4. Normal right ventricular size and systolic function.   5. Moderately dilated left atrium.   6. Mild mitral regurgitation.   7. Mild tricuspid regurgitation.   8. Pulmonary artery systolic pressure is 35 mmHg.   9. No pericardial effusion.  10. Mildly dilated aortic root.  11. Patient was tachycardic during the study.  12. No prior echo is available for comparison.    AYSHA w/Doppler (11.14.23 @ 15:14)   CONCLUSIONS:   1. Limited study obtained for evaluation of atria and its appendages   prior to DCCV.   2. Left ventricular endocardium is not well visualized. Grossly, left   ventricular function appears normal.   3. No LA/RA/LIV/RAA thrombus seen.   4. Trivial pericardial effusion.   5. There is severe non-mobile plaque seen in the visualized portion of   the descending aorta. There is moderate non-mobile plaque seen in the   visualized portion of the aortic arch.    [x]labs  A1C 5.4    TSH 1.83    Physical exam at discharge:  -Mental status: Awake, alert, oriented to person, place, and time. Speech is fluent with intact naming, repetition, and comprehension, no dysarthria. Recent and remote memory intact. Follows commands. Attention/concentration intact. Fund of knowledge appropriate.  -Cranial nerves:   II: Visual fields are full to confrontation.  III, IV, VI: Extraocular movements are intact without nystagmus. Pupils equally round and reactive to light  V:  Facial sensation V1-V3 equal and intact   VII: Face is symmetric with normal eye closure and smile  VIII: Hearing is bilaterally intact to finger rub  XII: Tongue protrudes midline  Motor: Normal bulk and tone. No pronator drift. Strength bilateral upper extremity 5/5, bilateral lower extremities 5/5.  Sensation: Intact to light touch bilaterally. No neglect or extinction on double simultaneous testing.  Coordination: No dysmetria on finger-to-nose bilaterally  Gait: Narrow gait and steady    Discharge NIHSS: 0     New medications on discharge: Eliquis 5 mg PO BID  Labs to be followed up: N/A  Imaging to be done as outpatient: N/A  Further outpatient workup: outpatient follow up with your cardiologist/EP and pulmonology once you return back to [place]

## 2023-11-13 NOTE — PHYSICAL THERAPY INITIAL EVALUATION ADULT - ADDITIONAL COMMENTS
Pt resides in [place] in [facility], is right handed, wears glasses for driving and movies. Pt. denies assistive device use, states she ambulates frequently.

## 2023-11-13 NOTE — OCCUPATIONAL THERAPY INITIAL EVALUATION ADULT - DIAGNOSIS, OT EVAL
Pt admitted for further stroke workup 2/2 syncope episode with impaired speech, symptoms now resolved, with pt presenting at functional baseline. No neurological deficits noted, strength, vision, and sensation intact with pt able to perform all ADLs/IADLs and mobility independently. No further acute OT needs. Pt will be d/c from OT at this time.

## 2023-11-13 NOTE — CONSULT NOTE ADULT - ASSESSMENT
76 y/o F, resides in [place], with PMHX of afib (on eliquis and previous ablation in 2021), hx of R MCA aneurysm s/p coil embolization in 2021, who originally presented to [facility] with an episode of LOC. Patient was having a cocktail with her friend and became warm, dizzy, and incoherent. Pt remembers waking up on floor surrounded by people and then came to. Pt transferred to [facility] for stroke vs. syncope workup, found to be with atrial tachycardia/aflutter with rates in 120s bpm. EP consulted for further management.     - Patient found to be in not typical atrial flutter with rates in 120s bpm. Sees EP doctor in [place].  - Patient reports compliance with eliquis 2.5 mg BID  - EP plan pending MRI, although LOC appears syncopal in nature, if MRI brain (+) for stroke rec to increase BB as tolerated for rate control and patient to f/u with her EP doctor in [place] for further management  - If MRI brain negative, patient likely candidate for DCCV, without AYSHA since eliquis compliant  78 y/o F, resides in [place], with PMHX of afib (on eliquis and previous ablation in 2021), hx of R MCA aneurysm s/p coil embolization in 2021, who originally presented to [facility] with an episode of LOC. Patient was having a cocktail with her friend and became warm, dizzy, and incoherent. Pt remembers waking up on floor surrounded by people and then came to. Pt transferred to [facility] for stroke vs. syncope workup, found to be with atrial tachycardia/aflutter with rates in 120s bpm. EP consulted for further management.     - Patient found to be in not typical atrial flutter with rates in 120s bpm. Sees EP doctor in [place] w/ hx of afib ablation in 2021   - Patient reports compliance with eliquis 2.5 mg BID  - EP plan pending MRI, although LOC appears syncopal in nature, if MRI brain (+) for stroke rec to increase BB as tolerated for rate control and patient to f/u with her EP doctor in [place] for further management  - If MRI brain negative, patient likely candidate for DCCV, without AYSHA since Eliquis compliant; keep NPO aftermidnight

## 2023-11-13 NOTE — OCCUPATIONAL THERAPY INITIAL EVALUATION ADULT - ADAPTIVE EQUIPMENT NEEDED
10/30/2023    From the office of:   Dandre Soto MD   [facility]  [address]  [place] [address]  Phone: [phone]    In regards to Srinath STAPLES Cesar Sr, :  1938    Chief Complaint   Patient presents with   • Nursing Home       [facility]    HISTORY OF PRESENT ILLNESS:  85-year-old male is seen for subsequent nursing home follow-up visit at [facility] on 10/30/2023.  History is obtained from prior chart records, discussion with facility nursing staff as well as interview and exam the patient.    Known history of diabetes mellitus type 2 with chronic kidney disease gait 4.  He does follow-up with Nephrology related to his chronic kidney disease as well as endocrinology related to diabetes which is insulin dependent.  He remains on 70/30 insulin 34 units in the morning and 14 units in the evening.  His blood sugars have been anywhere from .  He has not had any hypoglycemia.  His last hemoglobin A1c of 6.9 in August of this year.  His creatinine at that time 2.99 stage IV chronic kidney.      Regarding his hypertension, he remains on hydralazine 25 mg 3 times a day carvedilol 3.125 mg twice a day.  His blood pressures have been in the 120-150 systolic range.  He does have paroxysmal atrial fibrillation and his rate is controlled with carvedilol.  He is not on anticoagulation due to his multiple falls in the past.  He does have impaired mobility and ADLs although he is able to propel his wheelchair around the facility.    He does have osteoarthritis of the knees.  He did have a left knee injection in September with little improvement of his pain.  He questions whether he could have a knee arthroplasty at this time.    He does have anemia of chronic renal disease.  He remains on iron 1 daily.  He did iron IV and the path and he does follow-up with Nephrology.      He does have morbid obesity.  Weight has been relatively stable.  He is poorly  compliant with his diet.    Patient Active Problem List   Diagnosis   • Senile nuclear cataract   • Type 2 diabetes mellitus with stage 4 chronic kidney disease, with long-term current use of insulin (CMD)   • Hyperlipemia   • Benign essential hypertension   • Stage 4 chronic kidney disease (CMD)   • Background diabetic retinopathy(362.01)   • Hyperopia with astigmatism and presbyopia   • Osteoarthritis of both knees   • Anemia of renal disease   • Class 3 severe obesity due to excess calories with serious comorbidity and body mass index (BMI) of 40.0 to 44.9 in adult (CMD)   • Primary open angle glaucoma of both eyes, mild stage   • Vitamin D deficiency   • Amputated toe of left foot (CMD)   • Weight gain   • Atrial fibrillation (CMD)   • Memory deficit   • Acquired absence of other left toe(s) (CMD)   • Pressure injury of skin of buttock   • Onychomycosis of toenail   • Multiple falls   • DM type 2 with diabetic mixed hyperlipidemia (CMD)   • Hyperuricemia   • AV fistula (CMD)   • Background diabetic retinopathy (CMD)   • Impaired mobility and ADLs   • Weakness   • Chronic diastolic congestive heart failure (CMD)   • Atherosclerosis of aorta (CMD)   • Type 2 diabetes mellitus with microalbuminuria (CMD)   • Uses self-applied continuous glucose monitoring device       I have reviewed the patient's medications and allergies, with the most recent medication list reviewed on her penitentiary chart.    REVIEW OF SYSTEMS:  All other Review of Systems  negative except as documented in the history of present illness.    Physical Examination:  Pleasant elderly obese white male in no acute distress.  /75.  Pulse 71.  Temp 97.4°.  Weight of 328.  94% on room air.  HEENT-sclera anicteric.  Lips appear moist.  Neck-no thyroid enlargement or carotid bruits.  No adenopathy.  No JVD.  Lungs are clear bilaterally without crackles or wheezes.  Heart is regular 2/6 systolic murmur.  Abdomen is obese but not distended.  Bowel  sounds are normal.  No masses or hepatosplenomegaly.  He has chronic lower extremity edema 2+ with the knees bilaterally.  No calf pain or tenderness.  No palpable cords.    Srinath was seen today for nursing home.    Diagnoses and all orders for this visit:    Type 2 diabetes mellitus with stage 4 chronic kidney disease, with long-term current use of insulin (CMD)    Benign essential hypertension    Chronic diastolic congestive heart failure (CMD)    Paroxysmal atrial fibrillation (CMD)    Primary osteoarthritis of both knees    Anemia of renal disease    Class 3 severe obesity due to excess calories with serious comorbidity and body mass index (BMI) of 40.0 to 44.9 in adult (CMD)    Impaired mobility and ADLs        Plan:  Regarding his diabetes type 2, his control is fairly well controlled.  Will continue his 70/30 insulin at current doses.  He does have chronic kidney disease stage 4 and he does follow up with Nephrology related to this and his renal function has been stable.  Will schedule chemistry panel every hormones.      Regarding his hypertension, will continue current carvedilol 3.125 mg twice a day and hydralazine 25 mg t.i.d..    Regarding his chronic diastolic CHF, will continue his carvedilol and Bumex 2 mg daily.  He does have some chronic edema but will continue to monitor.      Regarding his paroxysmal atrial fibrillation, his rate is controlled with low-dose carvedilol.  He is not on anticoagulation due to multiple fall with history of gait instability.    Regarding his osteoarthritis of the knees, will continue p.r.n. acetaminophen.  He did have a left knee injection with little improvement.  I did discuss with him that he would not make a good candidate for knee arthroplasty I would refer him to Orthopedics if he so requested.      Regarding his anemia of chronic renal disease, will continue his iron once daily.    Regarding her morbid obesity, his weight is relatively stable.  We did discuss  further weight reduction.    Regarding his gait instability with impaired mobility, I did discuss with him regarding trying to attempt ambulation but he prefers to be in his wheelchair.      Dandre Soto MD   Electronically signed        no

## 2023-11-13 NOTE — PROGRESS NOTE ADULT - SUBJECTIVE AND OBJECTIVE BOX
Feeling well this morning, finally got to sleep a little bit.  Wondering when the EEG leads will be removed.  Walked to the bathroom without any dizziness or instability.      No events overnight.     Remaining ROS negative       PHYSICAL EXAM:    General: no acute distress, sitting up in bed  HEENT: NC/AT; MMM; EEG leads in place  Cardiovascular: +S1/S2, tachycardic  Respiratory: CTA B/L; no W/R/R  Gastrointestinal: soft, NT/ND; +BSx4  Extremities: WWP; no edema, small black lesion on ankle, very minimally tender to palpation (improving over a number of days)  Neurological: speech is fluent, follows commands, moves all extremities, no facial asymmetry  Psychiatric: pleasant mood and affect    VITAL SIGNS:  Vital Signs Last 24 Hrs  T(C): 36.7 (13 Nov 2023 09:03), Max: 37.2 (12 Nov 2023 13:51)  T(F): 98 (13 Nov 2023 09:03), Max: 98.9 (12 Nov 2023 13:51)  HR: 95 (13 Nov 2023 08:37) (92 - 126)  BP: 99/63 (13 Nov 2023 08:37) (91/63 - 127/74)  BP(mean): 76 (13 Nov 2023 08:37) (73 - 96)  RR: 18 (13 Nov 2023 08:37) (17 - 21)  SpO2: 96% (13 Nov 2023 08:37) (95% - 98%)    Parameters below as of 13 Nov 2023 08:37  Patient On (Oxygen Delivery Method): room air    MEDICATIONS:  MEDICATIONS  (STANDING):  apixaban 2.5 milliGRAM(s) Oral every 12 hours  aspirin  chewable 81 milliGRAM(s) Oral daily  atorvastatin 80 milliGRAM(s) Oral at bedtime  calcium carbonate   1250 mG (OsCal) 1 Tablet(s) Oral daily  cholecalciferol 1000 Unit(s) Oral daily  influenza  Vaccine (HIGH DOSE) 0.7 milliLiter(s) IntraMuscular once  melatonin 3 milliGRAM(s) Oral at bedtime  metoprolol tartrate 12.5 milliGRAM(s) Oral two times a day  multivitamin 1 Tablet(s) Oral daily    MEDICATIONS  (PRN):    ALLERGIES:  Allergies    No Known Allergies    Intolerances    fish (Vomiting)      LABS:                        12.6   5.63  )-----------( 153      ( 13 Nov 2023 05:30 )             37.9     11-13    139  |  106  |  16  ----------------------------<  87  4.2   |  25  |  0.64    Ca    8.6      13 Nov 2023 05:30  Phos  3.9     11-13  Mg     2.0     11-13    TPro  7.4  /  Alb  4.2  /  TBili  0.4  /  DBili  x   /  AST  19  /  ALT  34  /  AlkPhos  74  11-11    PT/INR - ( 11 Nov 2023 19:51 )   PT: 12.1 sec;   INR: 1.07          PTT - ( 11 Nov 2023 19:51 )  PTT:41.7 sec  Urinalysis Basic - ( 13 Nov 2023 05:30 )    Color: x / Appearance: x / SG: x / pH: x  Gluc: 87 mg/dL / Ketone: x  / Bili: x / Urobili: x   Blood: x / Protein: x / Nitrite: x   Leuk Esterase: x / RBC: x / WBC x   Sq Epi: x / Non Sq Epi: x / Bacteria: x      CAPILLARY BLOOD GLUCOSE      POCT Blood Glucose.: 106 mg/dL (11 Nov 2023 19:53)      RADIOLOGY & ADDITIONAL TESTS: Reviewed.

## 2023-11-13 NOTE — OCCUPATIONAL THERAPY INITIAL EVALUATION ADULT - PERTINENT HX OF CURRENT PROBLEM, REHAB EVAL
77y Female with PMHx of afib on Eliquis, hx of R MCA aneursym s/p coil embolization on ASA 81, breast CA in remission, presents to [facility] for TIA vs stroke evaluation. Pt presented to [facility] after having episode of gibberish speech followed by brief syncopal episode with quick resolution of symptoms. Stroke code called at [facility], NIHSS 0, CT imaging significant for focal moderate stenosis of left PCA P2 segment, s/p R MCA aneurysm coil embolization. Pt found to have elevated troponin in ED, EKG with sinus tachycardia, repeat troponin downtrended. Cardiology consulted in ED, no further cardiac workup indicated. Pt admitted to stroke tele for further management.

## 2023-11-13 NOTE — DISCHARGE NOTE PROVIDER - NSDCMRMEDTOKEN_GEN_ALL_CORE_FT
aspirin 81 mg oral tablet: 1 tab(s) orally once a day  calcium (as carbonate) 600 mg oral tablet: 1 tab(s) orally once a day  cholecalciferol 25 mcg (1000 intl units) oral capsule: 1 cap(s) orally once a day  Eliquis 2.5 mg oral tablet: 1 tab(s) orally 2 times a day  Multiple Vitamins oral capsule: 1 cap(s) orally once a day   aspirin 81 mg oral tablet: 1 tab(s) orally once a day  calcium (as carbonate) 600 mg oral tablet: 1 tab(s) orally once a day  cholecalciferol 25 mcg (1000 intl units) oral capsule: 1 cap(s) orally once a day  Eliquis 5 mg oral tablet: 1 tab(s) orally every 12 hours  Lipitor 20 mg oral tablet: 1 tab(s) orally once a day (at bedtime)  Multiple Vitamins oral capsule: 1 cap(s) orally once a day   aspirin 81 mg oral tablet: 1 tab(s) orally once a day  calcium (as carbonate) 600 mg oral tablet: 1 tab(s) orally once a day  cholecalciferol 25 mcg (1000 intl units) oral capsule: 1 cap(s) orally once a day  Eliquis 5 mg oral tablet: 1 tab(s) orally every 12 hours  Multiple Vitamins oral capsule: 1 cap(s) orally once a day

## 2023-11-13 NOTE — DISCHARGE NOTE PROVIDER - NSDCFUADDAPPT_GEN_ALL_CORE_FT
Please follow up with your cardiologist, Dr. Walsh, and electrophysiologist, Dr. Torres, once you return back to [place].  Please follow up with your cardiologist, Dr. Walsh, and electrophysiologist, Dr. Torres, once you return back to [place].     Please call your insurance company and follow up with a pulmonologist within network for further management of incidental pulmonary nodule see on CT.

## 2023-11-13 NOTE — DISCHARGE NOTE PROVIDER - DID THE PATIENT PRESENT WITH OR WAS TREATED FOR MALNUTRITION DURING THIS ADMISSION
Daily Note     Today's date: 1/3/2019  Patient name: Maxim Stock  : 1937  MRN: [id number]  Referring provider: Maryellen Valdivia DO  Dx:   Encounter Diagnosis     ICD-10-CM    1  Localized edema R60 0    2  Lymphedema I89 0                   Subjective: Pt reports she notices shedding some skin, still PN in legs when elevated, less PN when resting down  Objective: See treatment diary below      Assessment: Tolerated treatment fair  Patient would benefit from continued PT Pt has better carmelita to palpation this day, slight reduction in fibrosis, reduced edema noted overall B LE - assessed grossly  Plan: Continue per plan of care  Precautions: WC bound, requires Max A to transfer, cant carmelita supine, SCI  B LE lymphedema     Daily Treatment Diary     Manual  12- 12- 12- 1-3         B LE solaris ready wrap donning + instruction x20' x15' x10' x10'         MLD B LE - modified tx  x40' x35' x40'                                                    Exercise Diary                                                                                                                                                                                                                                                                                      Modalities No

## 2023-11-13 NOTE — PROGRESS NOTE ADULT - ASSESSMENT
77y Female with PMHx of afib on Eliquis, hx of R MCA aneurysm s/p coil embolization, breast CA in remission, presents to [facility] for TIA vs stroke vs syncope workup.    #TIA vs Syncope   - MR pending  - EEG without epileptiform discharges  - pending EP consult  - appreciate input from cardiology  - pending TTE  - no tongue bite reported during episode  - continue eliquis, atorvastatin  - monitor BP, continue on telemetry  - orthostatic vitals    #HLD  - continue atorvastatin 80mg    #Ankle wound +rash, unclear etiology  - small black lesion on ankle, appears to be a healed wound  - no inpatient evaluation necessary - would recommend follow up with dermatologist back in [place], where patient lives  - if any irritation, cream can be applied to legs as the area is slightly dry    #Tachycardia   #Atrial fibrillation s/p ablation  - appreciate input from EP - consult pending  - started on metoprolol - was tachycardic briefly up to the 140s this morning, and which then resolved  - continue metoprolol 12.5BID

## 2023-11-13 NOTE — DISCHARGE NOTE PROVIDER - CARE PROVIDER_API CALL
Deepak Walsh  Phone: [phone]  Fax: (   )    -  Follow Up Time:     Pola Torres  Phone: [phone]  Fax: (   )    -  Follow Up Time:

## 2023-11-13 NOTE — PROGRESS NOTE ADULT - SUBJECTIVE AND OBJECTIVE BOX
Neurology Stroke Progress Note    INTERVAL HPI/OVERNIGHT EVENTS:  Patient seen and examined. JUDY O/N. Patient states that she's feeling well, wanting to get VEEG removed. Denies any focal weakness, numbness, tingling, headache, N/V, double/blurry vision, neck pain, gait instability, or further episodes of speech disturbances.     MEDICATIONS  (STANDING):  apixaban 2.5 milliGRAM(s) Oral every 12 hours  aspirin  chewable 81 milliGRAM(s) Oral daily  atorvastatin 80 milliGRAM(s) Oral at bedtime  calcium carbonate   1250 mG (OsCal) 1 Tablet(s) Oral daily  cholecalciferol 1000 Unit(s) Oral daily  influenza  Vaccine (HIGH DOSE) 0.7 milliLiter(s) IntraMuscular once  melatonin 3 milliGRAM(s) Oral at bedtime  metoprolol succinate ER 25 milliGRAM(s) Oral daily  multivitamin 1 Tablet(s) Oral daily    MEDICATIONS  (PRN):      Allergies    No Known Allergies    Intolerances    fish (Vomiting)      Vital Signs Last 24 Hrs  T(C): 36.4 (13 Nov 2023 17:17), Max: 37 (13 Nov 2023 14:40)  T(F): 97.5 (13 Nov 2023 17:17), Max: 98.6 (13 Nov 2023 14:40)  HR: 90 (13 Nov 2023 17:38) (90 - 126)  BP: 109/68 (13 Nov 2023 17:38) (91/63 - 135/85)  BP(mean): 84 (13 Nov 2023 17:38) (73 - 105)  RR: 16 (13 Nov 2023 17:38) (16 - 21)  SpO2: 97% (13 Nov 2023 17:38) (94% - 99%)    Parameters below as of 13 Nov 2023 17:38  Patient On (Oxygen Delivery Method): room air        Physical exam:  General: No acute distress, awake and alert  Eyes: Anicteric sclerae, moist conjunctivae, see below for CNs  Extremities: No edema    Neurologic:  -Mental status: Awake, alert, oriented to person, place, and time. Speech is fluent with intact naming, repetition, and comprehension, no dysarthria. Recent and remote memory intact. Follows commands. Attention/concentration intact. Fund of knowledge appropriate.  -Cranial nerves:   II: Visual fields are full to confrontation.  III, IV, VI: Extraocular movements are intact without nystagmus. Pupils equally round and reactive to light  V:  Facial sensation V1-V3 equal and intact   VII: Face is symmetric with normal eye closure and smile  XII: Tongue protrudes midline  Motor: Normal bulk and tone. No pronator drift. Strength bilateral upper extremity 5/5, bilateral lower extremities 5/5.  Sensation: Intact to light touch bilaterally. No neglect or extinction on double simultaneous testing.  Coordination: No dysmetria on finger-to-nose bilaterally  Gait: Seen working with PT/OT, appears to have a narrow and steady gait    LABS:                        12.6   5.63  )-----------( 153      ( 13 Nov 2023 05:30 )             37.9     11-13    139  |  106  |  16  ----------------------------<  87  4.2   |  25  |  0.64    Ca    8.6      13 Nov 2023 05:30  Phos  3.9     11-13  Mg     2.0     11-13    TPro  7.4  /  Alb  4.2  /  TBili  0.4  /  DBili  x   /  AST  19  /  ALT  34  /  AlkPhos  74  11-11    PT/INR - ( 11 Nov 2023 19:51 )   PT: 12.1 sec;   INR: 1.07          PTT - ( 11 Nov 2023 19:51 )  PTT:41.7 sec  Urinalysis Basic - ( 13 Nov 2023 05:30 )    Color: x / Appearance: x / SG: x / pH: x  Gluc: 87 mg/dL / Ketone: x  / Bili: x / Urobili: x   Blood: x / Protein: x / Nitrite: x   Leuk Esterase: x / RBC: x / WBC x   Sq Epi: x / Non Sq Epi: x / Bacteria: x        RADIOLOGY & ADDITIONAL TESTS:    < from: TTE Echo Complete w/o Contrast w/ Doppler (11.13.23 @ 13:00) >  CONCLUSIONS:     1. Normal left ventricular size and systolic function.   2. Moderate symmetric left ventricular hypertrophy.   3. Grade II left ventricular diastolic dysfunction.   4. Normal right ventricular size and systolic function.   5. Moderately dilated left atrium.   6. Mild mitral regurgitation.   7. Mild tricuspid regurgitation.   8. Pulmonary artery systolic pressure is 35 mmHg.   9. No pericardial effusion.  10. Mildly dilated aortic root.  11. Patient was tachycardic during the study.  12. No prior echo is available for comparison.    < end of copied text >      VEEG:   Daily Summary:    Occasional left and right temporal focal slowing, indicating a mild degree of focal cerebral dysfunction in these regions.  No epileptiform activity and no significant clinical events occurred.    < from: CT Angio Chest PE Protocol w/ IV Cont (11.12.23 @ 12:28) >  IMPRESSION:  No pulmonary embolism.    3 mm right lower lobepulmonary nodule. Follow-up with noncontrast chest   CT can be performed for further evaluation as clinically warranted.    < end of copied text >

## 2023-11-13 NOTE — PHYSICAL THERAPY INITIAL EVALUATION ADULT - PATIENT/FAMILY AGREES WITH PLAN
Pt called and stated that he checked his my charts this morning and it shows that both of his b/p meds was dni.      I advise pt that the meds was sent over to [facility] mail order on 9/19    Pt said that he would check with [facility] mail order, but was concerned because his my charts showed that both meds was dni.    Pt said please contact him about meds, because iut take 7 to 14 days to send to him from mail order.    Amlodpine- Valsartan 5-160mg and Metoprolol Succinate XL 50 mg.    Pt can be reached at [phone]    yes

## 2023-11-13 NOTE — PHYSICAL THERAPY INITIAL EVALUATION ADULT - PERTINENT HX OF CURRENT PROBLEM, REHAB EVAL
77y Female with PMHx of afib on Eliquis, hx of R MCA aneursym s/p coil embolization on ASA 81, breast CA in remission, presents to [facility] for TIA vs stroke evaluation. Pt presented to [facility] after having episode of gibberish speech followed by brief syncopal episode with quick resolution of symptoms. Stroke code called at [facility], NIHSS 0, CT imaging significant for focal moderate stenosis of left PCA P2 segment, s/p R MCA aneurysm coil embolization. Pt found to have elevated troponin in ED, EKG with sinus tachycardia, repeat troponin downtrended. Cardiology consulted in ED, no further cardiac workup indicated. Pt admitted to stroke tele for further management.

## 2023-11-13 NOTE — CONSULT NOTE ADULT - ASSESSMENT
Neurology    77 y o Female with PMHx of afib on Eliquis, hx of R MCA aneursym s/p coil embolization on ASA 81, breast CA in remission, presents to [facility] for TIA vs stroke evaluation. Pt presented to [facility] after having episode of gibberish speech followed by brief syncopal episode with quick resolution of symptoms. Stroke code called at [facility], NIHSS 0, CT imaging significant for focal moderate stenosis of left PCA P2 segment, s/p R MCA aneurysm coil embolization. Pt found to have elevated troponin in ED, EKG with sinus tachycardia, repeat troponin downtrended. Cardiology consulted in ED, no further cardiac workup indicated. Pt admitted to stroke tele for further management.     Neuro  #TIA vs CVA workup  - continue Eliquis 2.5mg BID; patient qualifies for 5mg of Eliquis BID, however, she reports that she asked her cardiologist and PCP to keep her on 2.5 given her age is close to 78 and she is <60kg  - continue atorvastatin 40mg daily  - q4hr stroke neuro checks and vitals  - obtain MRI Brain without contrast  - obtain vEEG; placed at 2AM on 11/12 - shows left temporal slowing, will remain in place overnight  - Stroke Code HCT Results: neg   - Stroke Code CTA Results: focal moderate stenosis of left PCA P2 segment, s/p R MCA aneurysm coil embolization  - Stroke education    Cards  #HTN  - permissive hypertension, Goal -180  - obtain TTE   - Stroke Code EKG Results: sinus tachycardia     #HLD  - high dose statin as above in CVA; patient reports intolerance, will monitor for adverse effects  - LDL results: 134     #elevated troponin - pt is asymptomatic, EKG with sinus tachycardia   - downtrending troponin 170.4 --> 152.7  - ordered repeat troponin for 11/12 next collection    #sinus tachycardia; patient reports baseline HR between 40-60's  - ordered routine EKG  - f/u trop for next collection  - ordered 500cc bolus over 1 hour   - cardiology paged, awaiting call back    Pulm  - call provider if SPO2 < 94%    #syncope  - f/u CT PE protocol    GI  #Nutrition/Fluids/Electrolytes   - replete K<4 and Mg <2  - Diet: DASH/TLC     Renal  - monitor and trend Cr    Infectious Disease  - afebrile on admission, no leukocytosis    Endocrine  #DM  - A1C results: 5.4    - TSH results: 1.830    DVT Prophylaxis  - Eliquis 2.5mg BID  - SCDs for DVT prophylaxis       IDR Goals: Goals reviewed at interdisciplinary rounds with case management, social work, physical therapy, occupational therapy, and speech language pathology.   Please see specific therapy  notes for in depth goals.  Dispo: pending PM&R ,  PT/OT      Discussed daily hospital plans and goals with patient    Discussed with Neurology Attending Dr. Villarreal

## 2023-11-13 NOTE — PROGRESS NOTE ADULT - ASSESSMENT
77y Female with PMHx of afib on Eliquis, hx of R MCA aneursym s/p coil embolization on ASA 81, breast CA in remission, presents to [facility] for TIA vs stroke evaluation. Pt presented to [facility] after having episode of gibberish speech followed by brief syncopal episode with quick resolution of symptoms. Stroke code called at [facility], NIHSS 0, CT imaging significant for focal moderate stenosis of left PCA P2 segment, s/p R MCA aneurysm coil embolization. Pt found to have elevated troponin in ED, EKG with sinus tachycardia, repeat troponin downtrended. Cardiology consulted in ED, no further cardiac workup indicated. Pt admitted to stroke tele for further management.     Neuro  #TIA vs CVA workup  - continue Eliquis 2.5mg BID; patient qualifies for 5mg of Eliquis BID, however, she reports that she asked her cardiologist and PCP to keep her on 2.5 given her age is close to 78 and she is <60kg  - continue atorvastatin 40mg daily  - q4hr stroke neuro checks and vitals  - obtain MRI Brain without contrast short stroke  - VEEG with occasional left and right temporal focal slowing, no epileptiform activity  - Stroke Code HCT Results: neg   - Stroke Code CTA Results: focal moderate stenosis of left PCA P2 segment, s/p R MCA aneurysm coil embolization  - Stroke education    Cards  #HTN  - permissive hypertension, Goal -180  - TTE:   1. Normal left ventricular size and systolic function.   2. Moderate symmetric left ventricular hypertrophy.   3. Grade II left ventricular diastolic dysfunction.   4. Normal right ventricular size and systolic function.   5. Moderately dilated left atrium.   6. Mild mitral regurgitation.   7. Mild tricuspid regurgitation.   8. Pulmonary artery systolic pressure is 35 mmHg.   9. No pericardial effusion.  10. Mildly dilated aortic root.  11. Patient was tachycardic during the study.  12. No prior echo is available for comparison.  - Stroke Code EKG Results: sinus tachycardia     #HLD  - high dose statin as above in CVA; patient reports intolerance, will monitor for adverse effects  - LDL results: 134     #elevated troponin - pt is asymptomatic, EKG with sinus tachycardia   - downtrending troponin 170.4 --> 152.7 --> 21 (11/12)    #sinus tachycardia; patient reports baseline HR between 40-60's  - EKG concerning for atrial tachyarrhythmia vs aflutter  - given a IVF bolus without good response, cardiology consulted. Recommended starting Metoprolol 12.5 mg PO BID   - EP consulted per cards request, increased Metoprolol to 25 mg PO BID   - if MRI negative, would be a good candidate for cardioversion. patient agreeable    Pulm  - call provider if SPO2 < 94%    #syncope, persistent tachycardia  - CT PE protocol negative   - incidental 3 mm RLL pulmonary nodule, outpatient f/u    GI  #Nutrition/Fluids/Electrolytes   - replete K<4 and Mg <2  - Diet: DASH/TLC     Renal  - monitor and trend Cr    Infectious Disease  - afebrile on admission, no leukocytosis    Endocrine  #DM  - A1C results: 5.4    - TSH results: 1.830    DVT Prophylaxis  - Eliquis 2.5mg BID  - SCDs for DVT prophylaxis       IDR Goals: Goals reviewed at interdisciplinary rounds with case management, social work, physical therapy, occupational therapy, and speech language pathology.   Please see specific therapy  notes for in depth goals.  Dispo: no needs     Discussed daily hospital plans and goals with patient    Discussed with Neurology Attending, Dr. Tinoco

## 2023-11-13 NOTE — DISCHARGE NOTE PROVIDER - NSDCCPCAREPLAN_GEN_ALL_CORE_FT
PRINCIPAL DISCHARGE DIAGNOSIS  Diagnosis: Syncope  Assessment and Plan of Treatment: What is syncope?  Syncope (pronounced “sin-ko-pea”) is the medical term for fainting or passing out. It happens when you have a sudden, temporary drop in the amount of blood that flows to your brain. Most of the time, a harmless, short-term cause makes you faint.  What are the symptoms of syncope? The most common syncope symptoms include:  - Blacking out.  - Feeling lightheaded, dizzy, drowsy, groggy, unsteady or weak with standing  - Falling for no reason.  - Fainting, especially after eating or exercising.  - Changes in vision, such as seeing spots or having tunnel vision.  - Headaches  Cardiac syncope can occur if you have a heart or blood vessel condition that affects blood flow to your brain. These conditions can include:  - Abnormal heart rhythm (arrhythmia).  - Obstructed blood flow in your heart because of your heart’s structure (hypertrophic cardiomyopathy).  - Blockage in your heart’s blood vessels (myocardial ischemia).  - Valve disease.  - Aortic stenosis (narrowing).  - Blood clot.  - Heart failure.  If you have cardiac syncope, it’s important to see a cardiologist for proper treatment.  What tests will be done to diagnose syncope? Tests to determine causes of syncope include:  - Laboratory testing: Blood work to check for anemia or metabolic changes.  - Electrocardiogram (EKG): A test that records your heart’s electrical activity.  - Ambulatory monitor: A monitor you wear that uses electrodes to record information about your heart’s rate and rhythm.  - Echocardiogram: A test that uses sound waves to create an image of your heart’s structures.  Syncope treatment options include:  Taking medications or making changes to medications you already take.  - Wearing support garments or compression stockings to improve blood circulation.  - Making changes to your diet. Your provider may suggest that you eat small, frequent meals, eat more salt (sodium), drink more fluids, increase the amount of potassium in your diet and avoid caffeine and alcohol.  - Being extra cautious when you s     PRINCIPAL DISCHARGE DIAGNOSIS  Diagnosis: Syncope  Assessment and Plan of Treatment: What is syncope?  Syncope (pronounced “sin-ko-pea”) is the medical term for fainting or passing out. It happens when you have a sudden, temporary drop in the amount of blood that flows to your brain. Most of the time, a harmless, short-term cause makes you faint.  What are the symptoms of syncope? The most common syncope symptoms include:  - Blacking out.  - Feeling lightheaded, dizzy, drowsy, groggy, unsteady or weak with standing  - Falling for no reason.  - Fainting, especially after eating or exercising.  - Changes in vision, such as seeing spots or having tunnel vision.  - Headaches  Cardiac syncope can occur if you have a heart or blood vessel condition that affects blood flow to your brain. These conditions can include:  - Abnormal heart rhythm (arrhythmia).  - Obstructed blood flow in your heart because of your heart’s structure (hypertrophic cardiomyopathy).  - Blockage in your heart’s blood vessels (myocardial ischemia).  - Valve disease.  - Aortic stenosis (narrowing).  - Blood clot.  - Heart failure.  If you have cardiac syncope, it’s important to see a cardiologist for proper treatment.  What tests will be done to diagnose syncope? Tests to determine causes of syncope include:  - Laboratory testing: Blood work to check for anemia or metabolic changes.  - Electrocardiogram (EKG): A test that records your heart’s electrical activity.  - Ambulatory monitor: A monitor you wear that uses electrodes to record information about your heart’s rate and rhythm.  - Echocardiogram: A test that uses sound waves to create an image of your heart’s structures.  You underwent a cardioversion with electrophysiology as likely the cause of your syncope was due to a fast heart rate. Please continue to take your Eliquis for blood thinning and follow up with your cardiologist once you return back to [place]. Please seek medical attention if you experience any of the following: chest pain, shortness of breath, palpitations, dizziness, lightheadedness, diaphoresis, loss of consciousness, etc.

## 2023-11-13 NOTE — CONSULT NOTE ADULT - SUBJECTIVE AND OBJECTIVE BOX
incomplete     PI: 76 y/o F, w/ PMHx of afib (on eliquis), hx of R MCA aneurysm s/p coil embolization on ASA 81 mg daily, breast cancer in remission, who originally presented to [facility] on 11/11/23 with an episode of "gibberish speech" followed by brief syncopal episode with quick resolution of sx. Patient states she felt hot, light-headed, and like she was going to faint and remembers waking up on the floor. As soon as she woke up, she felt 90% back to normal. On arrival to [facility], patient at baseline, stroke code called, CT imaging significant for focal moderate stenosis of left PCA P2 segment, s/p R MCA aneurysm coil embolization. Pt found to have elevated troponin in ED, EKG with sinus tachycardia, repeat troponin downtrended. Pt transferred to [facility] for stroke vs. syncope       PAST MEDICAL & SURGICAL HISTORY:  Atrial fibrillation      History of intracranial aneurysm      Osteoporosis      History of breast cancer      S/P cerebral aneurysm repair          No pertinent family history in first degree relatives        Social History:no smoking, no drugs, no algohol    pertinent home medications:    Inpatient Medications:   apixaban 2.5 milliGRAM(s) Oral every 12 hours  aspirin  chewable 81 milliGRAM(s) Oral daily  atorvastatin 80 milliGRAM(s) Oral at bedtime  calcium carbonate   1250 mG (OsCal) 1 Tablet(s) Oral daily  cholecalciferol 1000 Unit(s) Oral daily  influenza  Vaccine (HIGH DOSE) 0.7 milliLiter(s) IntraMuscular once  melatonin 3 milliGRAM(s) Oral at bedtime  multivitamin 1 Tablet(s) Oral daily      Allergies: fish (Vomiting)  No Known Allergies      ROS:   CONSTITUTIONAL: No fever, weight loss + fatigue  EYES: Pt denies  RESPIRATORY: No cough, wheezing, chills or hemoptysis; No Shortness of Breath  CARDIOVASCULAR: see HPI  GASTROINTESTINAL: Pt denies  NEUROLOGICAL: Pt denies  SKIN: Pt denies   PSYCHIATRIC: Pt denies  HEME/LYMPH: Pt denies    PHYSICAL:  T(C): 36.7 (11-13-23 @ 09:03), Max: 37.2 (11-12-23 @ 13:51)  HR: 126 (11-13-23 @ 12:00) (92 - 126)  BP: 121/75 (11-13-23 @ 12:00) (91/63 - 127/74)  RR: 17 (11-13-23 @ 12:00) (17 - 21)  SpO2: 97% (11-13-23 @ 12:00) (95% - 98%)  Wt(kg): --  Appearance: No acute distress, well developed  Eyes: normal appearing conjunctiva, pupils and eyelids  Cardiovascular: Normal S1 S2, No JVD, No murmurs, No edema  Respiratory: Lungs clear to auscultation	bilaterally.  No wheeze, rhonchi, rales noted  Gastrointestinal:  Soft, NT/ND 	  Neurologic:  No deficit noted  Psych: A&Ox3, normal mood/affect  Musculoskeletal: normal gait  Skin: no rash noted, normal color and pigmentation.        LABS:                        12.6   5.63  )-----------( 153      ( 13 Nov 2023 05:30 )             37.9     11-13    139  |  106  |  16  ----------------------------<  87  4.2   |  25  |  0.64    Ca    8.6      13 Nov 2023 05:30  Phos  3.9     11-13  Mg     2.0     11-13    TPro  7.4  /  Alb  4.2  /  TBili  0.4  /  DBili  x   /  AST  19  /  ALT  34  /  AlkPhos  74  11-11    PT/INR - ( 11 Nov 2023 19:51 )   PT: 12.1 sec;   INR: 1.07          PTT - ( 11 Nov 2023 19:51 )  PTT:41.7 sec  TSH  Troponin    EKG:    Telemetry:    ECHO:    Prior EP procedures:    Cath / stress / Cardiac CTa:    Assessment Plan:         incomplete    HPI: 78 y/o F, w/ PMHx of afib (on eliquis), hx of R MCA aneurysm s/p coil embolization on ASA 81 mg daily, breast cancer in remission, who originally presented to [facility] on 11/11/23 with an episode of "gibberish speech" followed by brief syncopal episode with quick resolution of sx. Patient states she felt hot, light-headed, and like she was going to faint and remembers waking up on the floor. As soon as she woke up, she felt 90% back to normal. On arrival to [facility], patient at baseline, stroke code called, CT imaging significant for focal moderate stenosis of left PCA P2 segment, s/p R MCA aneurysm coil embolization. Pt found to have elevated troponin in ED, EKG with sinus tachycardia, repeat troponin downtrended. Pt transferred to [facility] for stroke vs. syncope     Patient seen and examined at bedside. Patient explained the episode that occurred prior to her LOC. Patient states that she started to feel warm and sweaty while drinking a cocktail with her friend. She then woke up on the floor. As per her friend, she was speaking "gibberish" prior to the episode and completely normal once she came to consciousness. Patient states she takes her vitals every morning and her heart rate was in 120s, when she is usually 60s-70s bpm. Patient resides in [place], has an EP doctor there, and has had an ablation in 2021. Patient also states she has had episodes of fainting in the past and the episodes have felt similar.     PAST MEDICAL & SURGICAL HISTORY:  Atrial fibrillation  History of intracranial aneurysm  Osteoporosis  History of breast cancer  S/P cerebral aneurysm repair      No pertinent family history in first degree relatives    pertinent home medications:    Inpatient Medications:   apixaban 2.5 milliGRAM(s) Oral every 12 hours  aspirin  chewable 81 milliGRAM(s) Oral daily  atorvastatin 80 milliGRAM(s) Oral at bedtime  calcium carbonate   1250 mG (OsCal) 1 Tablet(s) Oral daily  cholecalciferol 1000 Unit(s) Oral daily  influenza  Vaccine (HIGH DOSE) 0.7 milliLiter(s) IntraMuscular once  melatonin 3 milliGRAM(s) Oral at bedtime  multivitamin 1 Tablet(s) Oral daily      Allergies: fish (Vomiting)  No Known Allergies      ROS:   CONSTITUTIONAL: No fever, weight loss + fatigue  EYES: Pt denies  RESPIRATORY: No cough, wheezing, chills or hemoptysis; No Shortness of Breath  CARDIOVASCULAR: see HPI  GASTROINTESTINAL: Pt denies  NEUROLOGICAL: Pt denies  SKIN: Pt denies   PSYCHIATRIC: Pt denies  HEME/LYMPH: Pt denies    PHYSICAL:  T(C): 36.7 (11-13-23 @ 09:03), Max: 37.2 (11-12-23 @ 13:51)  HR: 126 (11-13-23 @ 12:00) (92 - 126)  BP: 121/75 (11-13-23 @ 12:00) (91/63 - 127/74)  RR: 17 (11-13-23 @ 12:00) (17 - 21)  SpO2: 97% (11-13-23 @ 12:00) (95% - 98%)  Wt(kg): --  Appearance: No acute distress, well developed  Eyes: normal appearing conjunctiva, pupils and eyelids  Cardiovascular: + tachycardia, irregular   Respiratory: Lungs clear to auscultation  Gastrointestinal:  Soft, NT/ND 	  Neurologic:  No deficit noted  Psych: A&Ox3, normal mood/affect  Musculoskeletal: normal gait  Skin: no rash noted, normal color and pigmentation.        LABS:                        12.6   5.63  )-----------( 153      ( 13 Nov 2023 05:30 )             37.9     11-13    139  |  106  |  16  ----------------------------<  87  4.2   |  25  |  0.64    Ca    8.6      13 Nov 2023 05:30  Phos  3.9     11-13  Mg     2.0     11-13    TPro  7.4  /  Alb  4.2  /  TBili  0.4  /  DBili  x   /  AST  19  /  ALT  34  /  AlkPhos  74  11-11    PT/INR - ( 11 Nov 2023 19:51 )   PT: 12.1 sec;   INR: 1.07          PTT - ( 11 Nov 2023 19:51 )  PTT:41.7 sec  TSH: 1.830  Troponin: 21   EKG: Atrial Tachycardia at  129 bpm, with 2:1 conduction delay     Telemetry: epsidoes of atrial tachycardia, rates up to 130s bpm, appears to be not typical flutter or atach with 2:1 conduction delay     Prior EP procedures: States she has had an atrial fibrillation ablation in 01/2021          HPI: 78 y/o F, w/ PMHx of afib (on eliquis), hx of R MCA aneurysm s/p coil embolization on ASA 81 mg daily, breast cancer in remission, who originally presented to [facility] on 11/11/23 with an episode of "gibberish speech" followed by brief syncopal episode with quick resolution of sx. Patient states she felt hot, light-headed, and like she was going to faint and remembers waking up on the floor. As soon as she woke up, she felt 90% back to normal. On arrival to [facility], patient at baseline, stroke code called, CT imaging significant for focal moderate stenosis of left PCA P2 segment, s/p R MCA aneurysm coil embolization. Pt found to have elevated troponin in ED, EKG with sinus tachycardia, repeat troponin downtrended. Pt transferred to [facility] for stroke vs. syncope     Patient seen and examined at bedside. Patient explained the episode that occurred prior to her LOC. Patient states that she started to feel warm and sweaty while drinking a cocktail with her friend. She then woke up on the floor. As per her friend, she was speaking "gibberish" prior to the episode and completely normal once she came to consciousness. Patient states she takes her vitals every morning and her heart rate was in 120s, when she is usually 60s-70s bpm. Patient resides in [place], has an EP doctor there, and has had an ablation in 2021. Patient also states she has had episodes of fainting in the past and the episodes have felt similar.     PAST MEDICAL & SURGICAL HISTORY:  Atrial fibrillation  History of intracranial aneurysm  Osteoporosis  History of breast cancer  S/P cerebral aneurysm repair    No pertinent family history in first degree relatives    pertinent home medications:    Inpatient Medications:   apixaban 2.5 milliGRAM(s) Oral every 12 hours  aspirin  chewable 81 milliGRAM(s) Oral daily  atorvastatin 80 milliGRAM(s) Oral at bedtime  calcium carbonate   1250 mG (OsCal) 1 Tablet(s) Oral daily  cholecalciferol 1000 Unit(s) Oral daily  influenza  Vaccine (HIGH DOSE) 0.7 milliLiter(s) IntraMuscular once  melatonin 3 milliGRAM(s) Oral at bedtime  multivitamin 1 Tablet(s) Oral daily    Allergies: fish (Vomiting)  No Known Allergies    ROS:   CONSTITUTIONAL: No fever, weight loss + fatigue  EYES: Pt denies  RESPIRATORY: No cough, wheezing, chills or hemoptysis; No Shortness of Breath  CARDIOVASCULAR: see HPI  GASTROINTESTINAL: Pt denies  NEUROLOGICAL: Pt denies  SKIN: Pt denies   PSYCHIATRIC: Pt denies  HEME/LYMPH: Pt denies    PHYSICAL:  T(C): 36.7 (11-13-23 @ 09:03), Max: 37.2 (11-12-23 @ 13:51)  HR: 126 (11-13-23 @ 12:00) (92 - 126)  BP: 121/75 (11-13-23 @ 12:00) (91/63 - 127/74)  RR: 17 (11-13-23 @ 12:00) (17 - 21)  SpO2: 97% (11-13-23 @ 12:00) (95% - 98%)  Wt(kg): --  Appearance: No acute distress, well developed  Eyes: normal appearing conjunctiva, pupils and eyelids  Cardiovascular: + tachycardia, irregular   Respiratory: Lungs clear to auscultation  Gastrointestinal:  Soft, NT/ND 	  Neurologic:  No deficit noted  Psych: A&Ox3, normal mood/affect  Musculoskeletal: normal gait  Skin: no rash noted, normal color and pigmentation.        LABS:                        12.6   5.63  )-----------( 153      ( 13 Nov 2023 05:30 )             37.9     11-13    139  |  106  |  16  ----------------------------<  87  4.2   |  25  |  0.64    Ca    8.6      13 Nov 2023 05:30  Phos  3.9     11-13  Mg     2.0     11-13    TPro  7.4  /  Alb  4.2  /  TBili  0.4  /  DBili  x   /  AST  19  /  ALT  34  /  AlkPhos  74  11-11    PT/INR - ( 11 Nov 2023 19:51 )   PT: 12.1 sec;   INR: 1.07          PTT - ( 11 Nov 2023 19:51 )  PTT:41.7 sec  TSH: 1.830  Troponin: 21   EKG: Atrial Tachycardia at  129 bpm, with 2:1 conduction delay     Telemetry: epsidoes of atrial tachycardia, rates up to 130s bpm, appears to be not typical flutter or atach with 2:1 conduction delay     Prior EP procedures: States she has had an atrial fibrillation ablation in 01/2021

## 2023-11-14 VITALS
SYSTOLIC BLOOD PRESSURE: 129 MMHG | TEMPERATURE: 97 F | HEART RATE: 64 BPM | OXYGEN SATURATION: 97 % | RESPIRATION RATE: 16 BRPM | DIASTOLIC BLOOD PRESSURE: 70 MMHG

## 2023-11-14 LAB
ANION GAP SERPL CALC-SCNC: 9 MMOL/L — SIGNIFICANT CHANGE UP (ref 5–17)
ANION GAP SERPL CALC-SCNC: 9 MMOL/L — SIGNIFICANT CHANGE UP (ref 5–17)
BUN SERPL-MCNC: 19 MG/DL — SIGNIFICANT CHANGE UP (ref 7–23)
BUN SERPL-MCNC: 19 MG/DL — SIGNIFICANT CHANGE UP (ref 7–23)
CALCIUM SERPL-MCNC: 9.2 MG/DL — SIGNIFICANT CHANGE UP (ref 8.4–10.5)
CALCIUM SERPL-MCNC: 9.2 MG/DL — SIGNIFICANT CHANGE UP (ref 8.4–10.5)
CHLORIDE SERPL-SCNC: 103 MMOL/L — SIGNIFICANT CHANGE UP (ref 96–108)
CHLORIDE SERPL-SCNC: 103 MMOL/L — SIGNIFICANT CHANGE UP (ref 96–108)
CO2 SERPL-SCNC: 28 MMOL/L — SIGNIFICANT CHANGE UP (ref 22–31)
CO2 SERPL-SCNC: 28 MMOL/L — SIGNIFICANT CHANGE UP (ref 22–31)
CREAT SERPL-MCNC: 0.67 MG/DL — SIGNIFICANT CHANGE UP (ref 0.5–1.3)
CREAT SERPL-MCNC: 0.67 MG/DL — SIGNIFICANT CHANGE UP (ref 0.5–1.3)
EGFR: 89 ML/MIN/1.73M2 — SIGNIFICANT CHANGE UP
EGFR: 89 ML/MIN/1.73M2 — SIGNIFICANT CHANGE UP
GLUCOSE SERPL-MCNC: 80 MG/DL — SIGNIFICANT CHANGE UP (ref 70–99)
GLUCOSE SERPL-MCNC: 80 MG/DL — SIGNIFICANT CHANGE UP (ref 70–99)
HCT VFR BLD CALC: 43.2 % — SIGNIFICANT CHANGE UP (ref 34.5–45)
HCT VFR BLD CALC: 43.2 % — SIGNIFICANT CHANGE UP (ref 34.5–45)
HGB BLD-MCNC: 14.3 G/DL — SIGNIFICANT CHANGE UP (ref 11.5–15.5)
HGB BLD-MCNC: 14.3 G/DL — SIGNIFICANT CHANGE UP (ref 11.5–15.5)
MAGNESIUM SERPL-MCNC: 1.9 MG/DL — SIGNIFICANT CHANGE UP (ref 1.6–2.6)
MAGNESIUM SERPL-MCNC: 1.9 MG/DL — SIGNIFICANT CHANGE UP (ref 1.6–2.6)
MCHC RBC-ENTMCNC: 33.1 GM/DL — SIGNIFICANT CHANGE UP (ref 32–36)
MCHC RBC-ENTMCNC: 33.1 GM/DL — SIGNIFICANT CHANGE UP (ref 32–36)
MCHC RBC-ENTMCNC: 34.2 PG — HIGH (ref 27–34)
MCHC RBC-ENTMCNC: 34.2 PG — HIGH (ref 27–34)
MCV RBC AUTO: 103.3 FL — HIGH (ref 80–100)
MCV RBC AUTO: 103.3 FL — HIGH (ref 80–100)
NRBC # BLD: 0 /100 WBCS — SIGNIFICANT CHANGE UP (ref 0–0)
NRBC # BLD: 0 /100 WBCS — SIGNIFICANT CHANGE UP (ref 0–0)
PHOSPHATE SERPL-MCNC: 4.2 MG/DL — SIGNIFICANT CHANGE UP (ref 2.5–4.5)
PHOSPHATE SERPL-MCNC: 4.2 MG/DL — SIGNIFICANT CHANGE UP (ref 2.5–4.5)
PLATELET # BLD AUTO: 188 K/UL — SIGNIFICANT CHANGE UP (ref 150–400)
PLATELET # BLD AUTO: 188 K/UL — SIGNIFICANT CHANGE UP (ref 150–400)
POTASSIUM SERPL-MCNC: 4.1 MMOL/L — SIGNIFICANT CHANGE UP (ref 3.5–5.3)
POTASSIUM SERPL-MCNC: 4.1 MMOL/L — SIGNIFICANT CHANGE UP (ref 3.5–5.3)
POTASSIUM SERPL-SCNC: 4.1 MMOL/L — SIGNIFICANT CHANGE UP (ref 3.5–5.3)
POTASSIUM SERPL-SCNC: 4.1 MMOL/L — SIGNIFICANT CHANGE UP (ref 3.5–5.3)
RBC # BLD: 4.18 M/UL — SIGNIFICANT CHANGE UP (ref 3.8–5.2)
RBC # BLD: 4.18 M/UL — SIGNIFICANT CHANGE UP (ref 3.8–5.2)
RBC # FLD: 12.8 % — SIGNIFICANT CHANGE UP (ref 10.3–14.5)
RBC # FLD: 12.8 % — SIGNIFICANT CHANGE UP (ref 10.3–14.5)
SODIUM SERPL-SCNC: 140 MMOL/L — SIGNIFICANT CHANGE UP (ref 135–145)
SODIUM SERPL-SCNC: 140 MMOL/L — SIGNIFICANT CHANGE UP (ref 135–145)
WBC # BLD: 7.33 K/UL — SIGNIFICANT CHANGE UP (ref 3.8–10.5)
WBC # BLD: 7.33 K/UL — SIGNIFICANT CHANGE UP (ref 3.8–10.5)
WBC # FLD AUTO: 7.33 K/UL — SIGNIFICANT CHANGE UP (ref 3.8–10.5)
WBC # FLD AUTO: 7.33 K/UL — SIGNIFICANT CHANGE UP (ref 3.8–10.5)

## 2023-11-14 PROCEDURE — 70496 CT ANGIOGRAPHY HEAD: CPT

## 2023-11-14 PROCEDURE — 99232 SBSQ HOSP IP/OBS MODERATE 35: CPT

## 2023-11-14 PROCEDURE — 70498 CT ANGIOGRAPHY NECK: CPT

## 2023-11-14 PROCEDURE — 36415 COLL VENOUS BLD VENIPUNCTURE: CPT

## 2023-11-14 PROCEDURE — 71045 X-RAY EXAM CHEST 1 VIEW: CPT

## 2023-11-14 PROCEDURE — 93312 ECHO TRANSESOPHAGEAL: CPT

## 2023-11-14 PROCEDURE — 95700 EEG CONT REC W/VID EEG TECH: CPT

## 2023-11-14 PROCEDURE — 82962 GLUCOSE BLOOD TEST: CPT

## 2023-11-14 PROCEDURE — 84443 ASSAY THYROID STIM HORMONE: CPT

## 2023-11-14 PROCEDURE — 85027 COMPLETE CBC AUTOMATED: CPT

## 2023-11-14 PROCEDURE — 83036 HEMOGLOBIN GLYCOSYLATED A1C: CPT

## 2023-11-14 PROCEDURE — 99291 CRITICAL CARE FIRST HOUR: CPT | Mod: 25

## 2023-11-14 PROCEDURE — 97165 OT EVAL LOW COMPLEX 30 MIN: CPT

## 2023-11-14 PROCEDURE — 84100 ASSAY OF PHOSPHORUS: CPT

## 2023-11-14 PROCEDURE — 84484 ASSAY OF TROPONIN QUANT: CPT

## 2023-11-14 PROCEDURE — 71275 CT ANGIOGRAPHY CHEST: CPT

## 2023-11-14 PROCEDURE — 70450 CT HEAD/BRAIN W/O DYE: CPT

## 2023-11-14 PROCEDURE — 80061 LIPID PANEL: CPT

## 2023-11-14 PROCEDURE — 95708 EEG WO VID EA 12-26HR UNMNTR: CPT

## 2023-11-14 PROCEDURE — 80048 BASIC METABOLIC PNL TOTAL CA: CPT

## 2023-11-14 PROCEDURE — 95705 EEG W/O VID 2-12 HR UNMNTR: CPT

## 2023-11-14 PROCEDURE — 70551 MRI BRAIN STEM W/O DYE: CPT | Mod: 26

## 2023-11-14 PROCEDURE — 92960 CARDIOVERSION ELECTRIC EXT: CPT

## 2023-11-14 PROCEDURE — 70551 MRI BRAIN STEM W/O DYE: CPT

## 2023-11-14 PROCEDURE — 85025 COMPLETE CBC W/AUTO DIFF WBC: CPT

## 2023-11-14 PROCEDURE — 92523 SPEECH SOUND LANG COMPREHEN: CPT

## 2023-11-14 PROCEDURE — 85610 PROTHROMBIN TIME: CPT

## 2023-11-14 PROCEDURE — 85730 THROMBOPLASTIN TIME PARTIAL: CPT

## 2023-11-14 PROCEDURE — 93306 TTE W/DOPPLER COMPLETE: CPT

## 2023-11-14 PROCEDURE — 97162 PT EVAL MOD COMPLEX 30 MIN: CPT

## 2023-11-14 PROCEDURE — 93312 ECHO TRANSESOPHAGEAL: CPT | Mod: 26

## 2023-11-14 PROCEDURE — 83735 ASSAY OF MAGNESIUM: CPT

## 2023-11-14 PROCEDURE — 80053 COMPREHEN METABOLIC PANEL: CPT

## 2023-11-14 PROCEDURE — 0042T: CPT

## 2023-11-14 RX ORDER — APIXABAN 2.5 MG/1
1 TABLET, FILM COATED ORAL
Refills: 0 | DISCHARGE

## 2023-11-14 RX ORDER — APIXABAN 2.5 MG/1
5 TABLET, FILM COATED ORAL EVERY 12 HOURS
Refills: 0 | Status: DISCONTINUED | OUTPATIENT
Start: 2023-11-14 | End: 2023-11-14

## 2023-11-14 RX ORDER — APIXABAN 2.5 MG/1
1 TABLET, FILM COATED ORAL
Qty: 60 | Refills: 0
Start: 2023-11-14 | End: 2023-12-13

## 2023-11-14 RX ORDER — ATORVASTATIN CALCIUM 80 MG/1
1 TABLET, FILM COATED ORAL
Qty: 30 | Refills: 0
Start: 2023-11-14 | End: 2023-12-13

## 2023-11-14 RX ORDER — ASPIRIN/CALCIUM CARB/MAGNESIUM 324 MG
1 TABLET ORAL
Refills: 0 | DISCHARGE

## 2023-11-14 RX ORDER — ASPIRIN/CALCIUM CARB/MAGNESIUM 324 MG
1 TABLET ORAL
Qty: 30 | Refills: 0
Start: 2023-11-14 | End: 2023-12-13

## 2023-11-14 RX ADMIN — Medication 1000 UNIT(S): at 18:47

## 2023-11-14 RX ADMIN — Medication 25 MILLIGRAM(S): at 08:02

## 2023-11-14 RX ADMIN — APIXABAN 5 MILLIGRAM(S): 2.5 TABLET, FILM COATED ORAL at 18:46

## 2023-11-14 RX ADMIN — Medication 1 TABLET(S): at 18:46

## 2023-11-14 RX ADMIN — Medication 81 MILLIGRAM(S): at 18:46

## 2023-11-14 RX ADMIN — APIXABAN 2.5 MILLIGRAM(S): 2.5 TABLET, FILM COATED ORAL at 06:04

## 2023-11-14 NOTE — DISCHARGE NOTE NURSING/CASE MANAGEMENT/SOCIAL WORK - NSDCFUADDAPPT_GEN_ALL_CORE_FT
Please follow up with your cardiologist, Dr. Walsh, and electrophysiologist, Dr. Torres, once you return back to [place].

## 2023-11-14 NOTE — PROGRESS NOTE ADULT - SUBJECTIVE AND OBJECTIVE BOX
Neurology Stroke Progress Note    INTERVAL HPI/OVERNIGHT EVENTS:  Patient seen and examined. Refused MRI o/n as she was unclear what type of coil she had. This AM, patient reports feeling well, anxious about wanting to go home. Now amendable to MRI. Will follow up with with EP regarding potential cardioversion.    MEDICATIONS  (STANDING):  apixaban 2.5 milliGRAM(s) Oral every 12 hours  aspirin  chewable 81 milliGRAM(s) Oral daily  atorvastatin 80 milliGRAM(s) Oral at bedtime  calcium carbonate   1250 mG (OsCal) 1 Tablet(s) Oral daily  cholecalciferol 1000 Unit(s) Oral daily  influenza  Vaccine (HIGH DOSE) 0.7 milliLiter(s) IntraMuscular once  melatonin 3 milliGRAM(s) Oral at bedtime  metoprolol succinate ER 25 milliGRAM(s) Oral daily  multivitamin 1 Tablet(s) Oral daily    MEDICATIONS  (PRN):      Allergies    No Known Allergies    Intolerances    fish (Vomiting)      Vital Signs Last 24 Hrs  T(C): 36.3 (14 Nov 2023 14:00), Max: 37 (13 Nov 2023 14:40)  T(F): 97.3 (14 Nov 2023 14:00), Max: 98.6 (13 Nov 2023 14:40)  HR: 86 (14 Nov 2023 09:39) (84 - 124)  BP: 117/78 (14 Nov 2023 09:39) (97/67 - 135/85)  BP(mean): 91 (14 Nov 2023 09:39) (68 - 106)  RR: 16 (14 Nov 2023 09:39) (16 - 20)  SpO2: 93% (14 Nov 2023 09:39) (89% - 99%)    Parameters below as of 14 Nov 2023 09:39  Patient On (Oxygen Delivery Method): room air        Physical exam:  General: No acute distress, awake and alert  Eyes: Anicteric sclerae, moist conjunctivae, see below for CNs  Extremities: No edema    Neurologic:  -Mental status: Awake, alert, oriented to person, place, and time. Speech is fluent with intact naming, repetition, and comprehension, no dysarthria. Recent and remote memory intact. Follows commands. Attention/concentration intact. Fund of knowledge appropriate.  -Cranial nerves:   II: Visual fields are full to confrontation.  III, IV, VI: Extraocular movements are intact without nystagmus. Pupils equally round and reactive to light  V:  Facial sensation V1-V3 equal and intact   VII: Face is symmetric with normal eye closure and smile.  XII: Tongue protrudes midline  Motor: Normal bulk and tone. No pronator drift. Strength bilateral upper extremity 5/5, bilateral lower extremities 5/5.  Sensation: Intact to light touch bilaterally. No neglect or extinction on double simultaneous testing.  Coordination: No dysmetria on finger-to-nose bilaterally  Gait: Deferred    LABS:                        14.3   7.33  )-----------( 188      ( 14 Nov 2023 05:30 )             43.2     11-14    140  |  103  |  19  ----------------------------<  80  4.1   |  28  |  0.67    Ca    9.2      14 Nov 2023 05:30  Phos  4.2     11-14  Mg     1.9     11-14        Urinalysis Basic - ( 14 Nov 2023 05:30 )    Color: x / Appearance: x / SG: x / pH: x  Gluc: 80 mg/dL / Ketone: x  / Bili: x / Urobili: x   Blood: x / Protein: x / Nitrite: x   Leuk Esterase: x / RBC: x / WBC x   Sq Epi: x / Non Sq Epi: x / Bacteria: x        RADIOLOGY & ADDITIONAL TESTS:  < from: MR Head No Cont (11.14.23 @ 13:01) >  IMPRESSION:    Foreshortened examination.    1.  No evidence of acute infarct or midline shift.  2.  Chronic small vessel disease.    < end of copied text >     Neurology Stroke Progress Note    INTERVAL HPI/OVERNIGHT EVENTS:  Patient seen and examined. Refused MRI o/n as she was unclear what type of coil she had. This AM, patient reports feeling well, anxious about wanting to go home. Now amendable to MRI. Will follow up with EP regarding potential cardioversion.    MEDICATIONS  (STANDING):  apixaban 2.5 milliGRAM(s) Oral every 12 hours  aspirin  chewable 81 milliGRAM(s) Oral daily  atorvastatin 80 milliGRAM(s) Oral at bedtime  calcium carbonate   1250 mG (OsCal) 1 Tablet(s) Oral daily  cholecalciferol 1000 Unit(s) Oral daily  influenza  Vaccine (HIGH DOSE) 0.7 milliLiter(s) IntraMuscular once  melatonin 3 milliGRAM(s) Oral at bedtime  metoprolol succinate ER 25 milliGRAM(s) Oral daily  multivitamin 1 Tablet(s) Oral daily    MEDICATIONS  (PRN):      Allergies    No Known Allergies    Intolerances    fish (Vomiting)      Vital Signs Last 24 Hrs  T(C): 36.3 (14 Nov 2023 14:00), Max: 37 (13 Nov 2023 14:40)  T(F): 97.3 (14 Nov 2023 14:00), Max: 98.6 (13 Nov 2023 14:40)  HR: 86 (14 Nov 2023 09:39) (84 - 124)  BP: 117/78 (14 Nov 2023 09:39) (97/67 - 135/85)  BP(mean): 91 (14 Nov 2023 09:39) (68 - 106)  RR: 16 (14 Nov 2023 09:39) (16 - 20)  SpO2: 93% (14 Nov 2023 09:39) (89% - 99%)    Parameters below as of 14 Nov 2023 09:39  Patient On (Oxygen Delivery Method): room air        Physical exam:  General: No acute distress, awake and alert  Eyes: Anicteric sclerae, moist conjunctivae, see below for CNs  Extremities: No edema    Neurologic:  -Mental status: Awake, alert, oriented to person, place, and time. Speech is fluent with intact naming, repetition, and comprehension, no dysarthria. Recent and remote memory intact. Follows commands. Attention/concentration intact. Fund of knowledge appropriate.  -Cranial nerves:   II: Visual fields are full to confrontation.  III, IV, VI: Extraocular movements are intact without nystagmus. Pupils equally round and reactive to light  V:  Facial sensation V1-V3 equal and intact   VII: Face is symmetric with normal eye closure and smile.  XII: Tongue protrudes midline  Motor: Normal bulk and tone. No pronator drift. Strength bilateral upper extremity 5/5, bilateral lower extremities 5/5.  Sensation: Intact to light touch bilaterally. No neglect or extinction on double simultaneous testing.  Coordination: No dysmetria on finger-to-nose bilaterally  Gait: Deferred    LABS:                        14.3   7.33  )-----------( 188      ( 14 Nov 2023 05:30 )             43.2     11-14    140  |  103  |  19  ----------------------------<  80  4.1   |  28  |  0.67    Ca    9.2      14 Nov 2023 05:30  Phos  4.2     11-14  Mg     1.9     11-14        Urinalysis Basic - ( 14 Nov 2023 05:30 )    Color: x / Appearance: x / SG: x / pH: x  Gluc: 80 mg/dL / Ketone: x  / Bili: x / Urobili: x   Blood: x / Protein: x / Nitrite: x   Leuk Esterase: x / RBC: x / WBC x   Sq Epi: x / Non Sq Epi: x / Bacteria: x        RADIOLOGY & ADDITIONAL TESTS:  < from: MR Head No Cont (11.14.23 @ 13:01) >  IMPRESSION:    Foreshortened examination.    1.  No evidence of acute infarct or midline shift.  2.  Chronic small vessel disease.    < end of copied text >

## 2023-11-14 NOTE — PROGRESS NOTE ADULT - NS ATTEST RISK PROBLEM GEN_ALL_CORE FT
Presence of two chronic medical issues and discussion of plan with stroke team.
Presence of two chronic medical issues (atrial fibrillation and HLD) and discussion of plan with stroke team.

## 2023-11-14 NOTE — PROGRESS NOTE ADULT - ASSESSMENT
77y Female with PMHx of afib on Eliquis, hx of R MCA aneursym s/p coil embolization on ASA 81, breast CA in remission, presents to [facility] for TIA vs stroke evaluation. Pt presented to [facility] after having episode of gibberish speech followed by brief syncopal episode with quick resolution of symptoms. Stroke code called at [facility], NIHSS 0, CT imaging significant for focal moderate stenosis of left PCA P2 segment, s/p R MCA aneurysm coil embolization. Pt found to have elevated troponin in ED, EKG with sinus tachycardia, repeat troponin downtrended. Cardiology consulted in ED, no further cardiac workup indicated. Pt admitted to stroke tele for further management.     Neuro  #TIA vs CVA workup  - continue Eliquis 2.5mg BID; patient qualifies for 5mg of Eliquis BID, however, she reports that she asked her cardiologist and PCP to keep her on 2.5 given her age is close to 78 and she is <60kg  - continue atorvastatin 40mg daily  - q4hr stroke neuro checks and vitals  - MRI brain: no acute infarct  - VEEG with occasional left and right temporal focal slowing, no epileptiform activity  - Stroke Code HCT Results: neg   - Stroke Code CTA Results: focal moderate stenosis of left PCA P2 segment, s/p R MCA aneurysm coil embolization  - Stroke education    Cards  #HTN  - permissive hypertension, Goal -180  - TTE:   1. Normal left ventricular size and systolic function.   2. Moderate symmetric left ventricular hypertrophy.   3. Grade II left ventricular diastolic dysfunction.   4. Normal right ventricular size and systolic function.   5. Moderately dilated left atrium.   6. Mild mitral regurgitation.   7. Mild tricuspid regurgitation.   8. Pulmonary artery systolic pressure is 35 mmHg.   9. No pericardial effusion.  10. Mildly dilated aortic root.  11. Patient was tachycardic during the study.  12. No prior echo is available for comparison.  - Stroke Code EKG Results: sinus tachycardia     #HLD  - high dose statin as above in CVA; patient reports intolerance, will monitor for adverse effects  - LDL results: 134     #elevated troponin - pt is asymptomatic, EKG with sinus tachycardia   - downtrending troponin 170.4 --> 152.7 --> 21    #sinus tachycardia; patient reports baseline HR between 40-60's  - EKG concerning for atrial tachyarrhythmia vs aflutter  - given a IVF bolus without good response, cardiology consulted. Appreciate recs  - continue Metoprolol 25 mg PO BID with hold parameters per EP recs  - MRI negative, plan for possible cardioversion today with EP    Pulm  - call provider if SPO2 < 94%    #syncope, persistent tachycardia  - CT PE protocol negative   - incidental 3 mm RLL pulmonary nodule, outpatient f/u    GI  #Nutrition/Fluids/Electrolytes   - replete K<4 and Mg <2  - Diet: DASH/TLC     Renal  - monitor and trend Cr    Infectious Disease  - afebrile on admission, no leukocytosis    Endocrine  #DM  - A1C results: 5.4    - TSH results: 1.830    DVT Prophylaxis  - Eliquis 2.5mg BID  - SCDs for DVT prophylaxis       IDR Goals: Goals reviewed at interdisciplinary rounds with case management, social work, physical therapy, occupational therapy, and speech language pathology.   Please see specific therapy  notes for in depth goals.  Dispo: no needs     Discussed daily hospital plans and goals with patient    Discussed with Neurology Attending, Dr. Tinoco 77y Female with PMHx of afib on Eliquis, hx of R MCA aneursym s/p coil embolization on ASA 81, breast CA in remission, presents to [facility] for TIA vs stroke evaluation. Pt presented to [facility] after having episode of gibberish speech followed by brief syncopal episode with quick resolution of symptoms. Stroke code called at [facility], NIHSS 0, CT imaging significant for focal moderate stenosis of left PCA P2 segment, s/p R MCA aneurysm coil embolization. Pt found to have elevated troponin in ED, EKG with sinus tachycardia, repeat troponin downtrended. Cardiology consulted in ED, no further cardiac workup indicated. Pt admitted to stroke tele for further management.     Neuro  #TIA vs CVA workup  - continue Eliquis 2.5mg BID; patient qualifies for 5mg of Eliquis BID, however, she reports that she asked her cardiologist and PCP to keep her on 2.5 given her age is close to 78 and she is <60kg  - continue atorvastatin 40mg daily  - q4hr stroke neuro checks and vitals  - MRI brain: no acute infarct  - VEEG with occasional left and right temporal focal slowing, no epileptiform activity  - Stroke Code HCT Results: neg   - Stroke Code CTA Results: focal moderate stenosis of left PCA P2 segment, s/p R MCA aneurysm coil embolization  - Stroke education    Cards  #HTN  - permissive hypertension, Goal -180  - TTE:   1. Normal left ventricular size and systolic function.   2. Moderate symmetric left ventricular hypertrophy.   3. Grade II left ventricular diastolic dysfunction.   4. Normal right ventricular size and systolic function.   5. Moderately dilated left atrium.   6. Mild mitral regurgitation.   7. Mild tricuspid regurgitation.   8. Pulmonary artery systolic pressure is 35 mmHg.   9. No pericardial effusion.  10. Mildly dilated aortic root.  11. Patient was tachycardic during the study.  12. No prior echo is available for comparison.  - Stroke Code EKG Results: sinus tachycardia     #HLD  - high dose statin as above in CVA; patient reports intolerance, will monitor for adverse effects  - LDL results: 134     #elevated troponin - pt is asymptomatic, EKG with sinus tachycardia   - downtrending troponin 170.4 --> 152.7 --> 21    #sinus tachycardia; patient reports baseline HR between 40-60's  - EKG concerning for atrial tachyarrhythmia vs aflutter  - given a IVF bolus without good response, cardiology consulted. Appreciate recs  - continue Metoprolol 25 mg PO BID with hold parameters per EP recs  - MRI negative, plan for AYSHA and possible cardioversion today with EP    Pulm  - call provider if SPO2 < 94%    #syncope, persistent tachycardia  - CT PE protocol negative   - incidental 3 mm RLL pulmonary nodule, outpatient f/u    GI  #Nutrition/Fluids/Electrolytes   - replete K<4 and Mg <2  - Diet: DASH/TLC     Renal  - monitor and trend Cr    Infectious Disease  - afebrile on admission, no leukocytosis    Endocrine  #DM  - A1C results: 5.4    - TSH results: 1.830    DVT Prophylaxis  - Eliquis 2.5mg BID  - SCDs for DVT prophylaxis       IDR Goals: Goals reviewed at interdisciplinary rounds with case management, social work, physical therapy, occupational therapy, and speech language pathology.   Please see specific therapy  notes for in depth goals.  Dispo: no needs     Discussed daily hospital plans and goals with patient    Discussed with Neurology Attending, Dr. Tinoco

## 2023-11-14 NOTE — PROGRESS NOTE ADULT - ASSESSMENT
78 y/o F, resides in [place], with PMHX of afib (on eliquis and previous ablation in 2021), hx of R MCA aneurysm s/p coil embolization in 2021, who originally presented to [facility] with an episode of LOC. Patient was having a cocktail with her friend and became warm, dizzy, and incoherent. Pt remembers waking up on floor surrounded by people and then came to. Pt transferred to [facility] for stroke vs. syncope workup, found to be with atrial tachycardia/aflutter with rates in 120s bpm. EP consulted for further management. Patient brain (-) negative for Stroke. Ok to proceed with AYSHA/DCCV. Patient needs AYSHA as she has been underdosed on her eliquis (does not meet 2/3 criteria for eliquis 2.5 mg bid)     - The DCCV procedure, along with associated risks, including but not limited to stroke and flash pulmonary edema were discussed. All questions answered and informed consent signed.   - Patient technically does not meed criteria for dose reduced eliquis - patient is technically 78 y/o, <60 kg, and Cr normal   - Will go for AYSHA today and then DCCV  - Patient to f/u with her EP doctor in [place] upon discharge

## 2023-11-14 NOTE — PROGRESS NOTE ADULT - SUBJECTIVE AND OBJECTIVE BOX
Feeling okay, wondering when MRI and cardioversion will be done.     Remaining ROS negative     PHYSICAL EXAM:    General: no acute distress, sitting up in chair  HEENT: NC/AT; MMM  Cardiovascular: +S1/S2, no mrg  Respiratory: CTA B/L; no W/R/R  Gastrointestinal: soft, NT/ND; +BSx4  Extremities: WWP; no edema  Neurological: speech is fluent, follows commands, moves all extremities, no facial asymmetry  Psychiatric: pleasant mood and affect    VITAL SIGNS:  Vital Signs Last 24 Hrs  T(C): 36.6 (14 Nov 2023 10:00), Max: 37 (13 Nov 2023 14:40)  T(F): 97.9 (14 Nov 2023 10:00), Max: 98.6 (13 Nov 2023 14:40)  HR: 86 (14 Nov 2023 09:39) (84 - 124)  BP: 117/78 (14 Nov 2023 09:39) (97/67 - 135/85)  BP(mean): 91 (14 Nov 2023 09:39) (68 - 106)  RR: 16 (14 Nov 2023 09:39) (16 - 20)  SpO2: 93% (14 Nov 2023 09:39) (89% - 99%)    Parameters below as of 14 Nov 2023 09:39  Patient On (Oxygen Delivery Method): room air          MEDICATIONS:  MEDICATIONS  (STANDING):  apixaban 2.5 milliGRAM(s) Oral every 12 hours  aspirin  chewable 81 milliGRAM(s) Oral daily  atorvastatin 80 milliGRAM(s) Oral at bedtime  calcium carbonate   1250 mG (OsCal) 1 Tablet(s) Oral daily  cholecalciferol 1000 Unit(s) Oral daily  influenza  Vaccine (HIGH DOSE) 0.7 milliLiter(s) IntraMuscular once  melatonin 3 milliGRAM(s) Oral at bedtime  metoprolol succinate ER 25 milliGRAM(s) Oral daily  multivitamin 1 Tablet(s) Oral daily    MEDICATIONS  (PRN):      ALLERGIES:  Allergies    No Known Allergies    Intolerances    fish (Vomiting)      LABS:                        14.3   7.33  )-----------( 188      ( 14 Nov 2023 05:30 )             43.2     11-14    140  |  103  |  19  ----------------------------<  80  4.1   |  28  |  0.67    Ca    9.2      14 Nov 2023 05:30  Phos  4.2     11-14  Mg     1.9     11-14        Urinalysis Basic - ( 14 Nov 2023 05:30 )    Color: x / Appearance: x / SG: x / pH: x  Gluc: 80 mg/dL / Ketone: x  / Bili: x / Urobili: x   Blood: x / Protein: x / Nitrite: x   Leuk Esterase: x / RBC: x / WBC x   Sq Epi: x / Non Sq Epi: x / Bacteria: x      CAPILLARY BLOOD GLUCOSE          RADIOLOGY & ADDITIONAL TESTS: Reviewed.

## 2023-11-14 NOTE — PROGRESS NOTE ADULT - SUBJECTIVE AND OBJECTIVE BOX
HPI: 78 y/o F, w/ PMHx of afib (on eliquis), hx of R MCA aneurysm s/p coil embolization on ASA 81 mg daily, breast cancer in remission, who originally presented to [facility] on 11/11/23 with an episode of "gibberish speech" followed by brief syncopal episode with quick resolution of sx. Patient states she felt hot, light-headed, and like she was going to faint and remembers waking up on the floor. As soon as she woke up, she felt 90% back to normal. On arrival to [facility], patient at baseline, stroke code called, CT imaging significant for focal moderate stenosis of left PCA P2 segment, s/p R MCA aneurysm coil embolization. Pt found to have elevated troponin in ED, EKG with sinus tachycardia, repeat troponin downtrended. Pt transferred to [facility] for stroke vs. syncope     Patient seen and examined at bedside this morning. Patient awaiting MRI brain, as it was cancelled last night, awaiting MRI today. Patient to undergo AYSHA/DCCV if MRI negative. Patient without any other complaints.     PAST MEDICAL & SURGICAL HISTORY:  Atrial fibrillation  History of intracranial aneurysm  Osteoporosis  History of breast cancer  S/P cerebral aneurysm repair    No pertinent family history in first degree relatives    pertinent home medications:    Inpatient Medications:   apixaban 2.5 milliGRAM(s) Oral every 12 hours  aspirin  chewable 81 milliGRAM(s) Oral daily  atorvastatin 80 milliGRAM(s) Oral at bedtime  calcium carbonate   1250 mG (OsCal) 1 Tablet(s) Oral daily  cholecalciferol 1000 Unit(s) Oral daily  influenza  Vaccine (HIGH DOSE) 0.7 milliLiter(s) IntraMuscular once  melatonin 3 milliGRAM(s) Oral at bedtime  multivitamin 1 Tablet(s) Oral daily    Allergies: fish (Vomiting)  No Known Allergies    ROS:   CONSTITUTIONAL: No fever, weight loss + fatigue  EYES: Pt denies  RESPIRATORY: No cough, wheezing, chills or hemoptysis; No Shortness of Breath  CARDIOVASCULAR: see HPI  GASTROINTESTINAL: Pt denies  NEUROLOGICAL: Pt denies  SKIN: Pt denies   PSYCHIATRIC: Pt denies  HEME/LYMPH: Pt denies    PHYSICAL:  T(C): 36.7 (11-13-23 @ 09:03), Max: 37.2 (11-12-23 @ 13:51)  HR: 126 (11-13-23 @ 12:00) (92 - 126)  BP: 121/75 (11-13-23 @ 12:00) (91/63 - 127/74)  RR: 17 (11-13-23 @ 12:00) (17 - 21)  SpO2: 97% (11-13-23 @ 12:00) (95% - 98%)  Wt(kg): --  Appearance: No acute distress, well developed  Eyes: normal appearing conjunctiva, pupils and eyelids  Cardiovascular: + tachycardia, irregular   Respiratory: Lungs clear to auscultation  Gastrointestinal:  Soft, NT/ND 	  Neurologic:  No deficit noted  Psych: A&Ox3, normal mood/affect  Musculoskeletal: normal gait  Skin: no rash noted, normal color and pigmentation.        LABS:                        12.6   5.63  )-----------( 153      ( 13 Nov 2023 05:30 )             37.9     11-13    139  |  106  |  16  ----------------------------<  87  4.2   |  25  |  0.64    Ca    8.6      13 Nov 2023 05:30  Phos  3.9     11-13  Mg     2.0     11-13    TPro  7.4  /  Alb  4.2  /  TBili  0.4  /  DBili  x   /  AST  19  /  ALT  34  /  AlkPhos  74  11-11    PT/INR - ( 11 Nov 2023 19:51 )   PT: 12.1 sec;   INR: 1.07          PTT - ( 11 Nov 2023 19:51 )  PTT:41.7 sec  TSH: 1.830  Troponin: 21   EKG: Atrial Tachycardia at  129 bpm, with 2:1 conduction delay     Telemetry: Episdoes of atrial tachycardia, rates up to 130s bpm, appears to be not typical flutter or atach with 2:1 conduction delay     Prior EP procedures: States she has had an atrial fibrillation ablation in 01/2021

## 2023-11-14 NOTE — PROGRESS NOTE ADULT - SUBJECTIVE AND OBJECTIVE BOX
Neurology Stroke Progress Note    INTERVAL HPI/OVERNIGHT EVENTS:  Patient seen and examined.  number ______ used.    MEDICATIONS  (STANDING):  apixaban 2.5 milliGRAM(s) Oral every 12 hours  aspirin  chewable 81 milliGRAM(s) Oral daily  atorvastatin 80 milliGRAM(s) Oral at bedtime  calcium carbonate   1250 mG (OsCal) 1 Tablet(s) Oral daily  cholecalciferol 1000 Unit(s) Oral daily  influenza  Vaccine (HIGH DOSE) 0.7 milliLiter(s) IntraMuscular once  melatonin 3 milliGRAM(s) Oral at bedtime  metoprolol succinate ER 25 milliGRAM(s) Oral daily  multivitamin 1 Tablet(s) Oral daily    MEDICATIONS  (PRN):      Allergies    No Known Allergies    Intolerances    fish (Vomiting)      Vital Signs Last 24 Hrs  T(C): 36.6 (14 Nov 2023 10:00), Max: 37 (13 Nov 2023 14:40)  T(F): 97.9 (14 Nov 2023 10:00), Max: 98.6 (13 Nov 2023 14:40)  HR: 86 (14 Nov 2023 09:39) (84 - 124)  BP: 117/78 (14 Nov 2023 09:39) (97/67 - 135/85)  BP(mean): 91 (14 Nov 2023 09:39) (68 - 106)  RR: 16 (14 Nov 2023 09:39) (16 - 20)  SpO2: 93% (14 Nov 2023 09:39) (89% - 99%)    Parameters below as of 14 Nov 2023 09:39  Patient On (Oxygen Delivery Method): room air        Physical exam:  General: No acute distress, awake and alert  Eyes: Anicteric sclerae, moist conjunctivae, see below for CNs  Neck: trachea midline, FROM, supple, no thyromegaly or lymphadenopathy  Cardiovascular: Regular rate and rhythm, no murmurs, rubs, or gallops. No carotid bruits.   Pulmonary: Anterior breath sounds clear bilaterally, no crackles or wheezing. No use of accessory muscles  GI: Abdomen soft, non-distended, non-tender  Extremities: Radial and DP pulses +2, no edema    Neurologic:  -Mental status: Awake, alert, oriented to person, place, and time. Speech is fluent with intact naming, repetition, and comprehension, no dysarthria. Recent and remote memory intact. Follows commands. Attention/concentration intact. Fund of knowledge appropriate.  -Cranial nerves:   II: Visual fields are full to confrontation.  III, IV, VI: Extraocular movements are intact without nystagmus. Pupils equally round and reactive to light  V:  Facial sensation V1-V3 equal and intact   VII: Face is symmetric with normal eye closure and smile  VIII: Hearing is bilaterally intact to finger rub  IX, X: Uvula is midline and soft palate rises symmetrically  XI: Head turning and shoulder shrug are intact.  XII: Tongue protrudes midline  Motor: Normal bulk and tone. No pronator drift. Strength bilateral upper extremity 5/5, bilateral lower extremities 5/5.  Rapid alternating movements intact and symmetric  Sensation: Intact to light touch bilaterally. No neglect or extinction on double simultaneous testing.  Coordination: No dysmetria on finger-to-nose and heel-to-shin bilaterally  Reflexes: Downgoing toes bilaterally   Gait: Narrow gait and steady    LABS:                        14.3   7.33  )-----------( 188      ( 14 Nov 2023 05:30 )             43.2     11-14    140  |  103  |  19  ----------------------------<  80  4.1   |  28  |  0.67    Ca    9.2      14 Nov 2023 05:30  Phos  4.2     11-14  Mg     1.9     11-14        Urinalysis Basic - ( 14 Nov 2023 05:30 )    Color: x / Appearance: x / SG: x / pH: x  Gluc: 80 mg/dL / Ketone: x  / Bili: x / Urobili: x   Blood: x / Protein: x / Nitrite: x   Leuk Esterase: x / RBC: x / WBC x   Sq Epi: x / Non Sq Epi: x / Bacteria: x        RADIOLOGY & ADDITIONAL TESTS:

## 2023-11-14 NOTE — DISCHARGE NOTE NURSING/CASE MANAGEMENT/SOCIAL WORK - PATIENT PORTAL LINK FT
You can access the FollowMyHealth Patient Portal offered by [facility] by registering at the following website: [email or web address]. By joining [facility]’s FollowMyHealth portal, you will also be able to view your health information using other applications (apps) compatible with our system.

## 2023-11-14 NOTE — PROGRESS NOTE ADULT - ASSESSMENT
77y Female with PMHx of afib on Eliquis, hx of R MCA aneurysm s/p coil embolization, breast CA in remission, presents to [facility] for TIA vs stroke vs syncope workup.    #TIA vs Syncope   - MR pending  - EEG without epileptiform discharges  - pending cardioversion after MRI  - appreciate input from cardiology  - pending TTE  - no tongue bite reported during episode  - continue eliquis, atorvastatin  - monitor BP, continue on telemetry  - orthostatic vitals    #HLD  - continue atorvastatin 80mg    #Ankle wound +rash, unclear etiology  - small black lesion on ankle, appears to be a healed wound  - no inpatient evaluation necessary - would recommend follow up with dermatologist back in [place], where patient lives  - if any irritation, cream can be applied to legs as the area is slightly dry    #Tachycardia   #Atrial fibrillation s/p ablation  - planned for cardioversion today

## 2023-11-16 PROBLEM — Z00.00 ENCOUNTER FOR PREVENTIVE HEALTH EXAMINATION: Status: ACTIVE | Noted: 2023-11-16

## 2023-11-21 DIAGNOSIS — R21 RASH AND OTHER NONSPECIFIC SKIN ERUPTION: ICD-10-CM

## 2023-11-21 DIAGNOSIS — I10 ESSENTIAL (PRIMARY) HYPERTENSION: ICD-10-CM

## 2023-11-21 DIAGNOSIS — R91.1 SOLITARY PULMONARY NODULE: ICD-10-CM

## 2023-11-21 DIAGNOSIS — Z79.01 LONG TERM (CURRENT) USE OF ANTICOAGULANTS: ICD-10-CM

## 2023-11-21 DIAGNOSIS — I48.0 PAROXYSMAL ATRIAL FIBRILLATION: ICD-10-CM

## 2023-11-21 DIAGNOSIS — I47.19 OTHER SUPRAVENTRICULAR TACHYCARDIA: ICD-10-CM

## 2023-11-21 DIAGNOSIS — E78.5 HYPERLIPIDEMIA, UNSPECIFIED: ICD-10-CM

## 2023-11-21 DIAGNOSIS — I48.92 UNSPECIFIED ATRIAL FLUTTER: ICD-10-CM

## 2023-11-21 DIAGNOSIS — Z79.82 LONG TERM (CURRENT) USE OF ASPIRIN: ICD-10-CM

## 2023-11-21 DIAGNOSIS — Z86.79 PERSONAL HISTORY OF OTHER DISEASES OF THE CIRCULATORY SYSTEM: ICD-10-CM

## 2023-11-21 DIAGNOSIS — Z85.3 PERSONAL HISTORY OF MALIGNANT NEOPLASM OF BREAST: ICD-10-CM

## 2023-11-21 DIAGNOSIS — M81.0 AGE-RELATED OSTEOPOROSIS WITHOUT CURRENT PATHOLOGICAL FRACTURE: ICD-10-CM

## 2023-11-21 DIAGNOSIS — R55 SYNCOPE AND COLLAPSE: ICD-10-CM

## 2023-11-21 DIAGNOSIS — R77.8 OTHER SPECIFIED ABNORMALITIES OF PLASMA PROTEINS: ICD-10-CM

## 2023-11-21 DIAGNOSIS — I66.22 OCCLUSION AND STENOSIS OF LEFT POSTERIOR CEREBRAL ARTERY: ICD-10-CM
